# Patient Record
Sex: FEMALE | Race: WHITE | NOT HISPANIC OR LATINO | Employment: UNEMPLOYED | URBAN - METROPOLITAN AREA
[De-identification: names, ages, dates, MRNs, and addresses within clinical notes are randomized per-mention and may not be internally consistent; named-entity substitution may affect disease eponyms.]

---

## 2023-10-30 LAB
C TRACH DNA SPEC QL PROBE+SIG AMP: NOT DETECTED
N GONORRHOEA DNA SPEC QL PROBE+SIG AMP: NOT DETECTED
SPECIMEN DESCRIP: NORMAL
SPECIMEN DESCRIPTION: NORMAL
TSH SERPL-ACNC: 2.37 MIU/L (ref 0.4–4.5)

## 2023-11-20 ENCOUNTER — TRANSFERRED RECORDS (OUTPATIENT)
Dept: HEALTH INFORMATION MANAGEMENT | Facility: CLINIC | Age: 28
End: 2023-11-20

## 2023-12-04 ENCOUNTER — VIRTUAL VISIT (OUTPATIENT)
Dept: OBGYN | Facility: CLINIC | Age: 28
End: 2023-12-04
Payer: COMMERCIAL

## 2023-12-04 DIAGNOSIS — Z34.90 ENCOUNTER FOR PRENATAL CARE: Primary | ICD-10-CM

## 2023-12-04 RX ORDER — MECLIZINE HYDROCHLORIDE 25 MG/1
25 TABLET ORAL AT BEDTIME
COMMUNITY
End: 2024-06-05

## 2023-12-04 RX ORDER — LANOLIN ALCOHOL/MO/W.PET/CERES
400 CREAM (GRAM) TOPICAL DAILY
COMMUNITY
End: 2024-06-05

## 2023-12-04 RX ORDER — CALCIUM CARBONATE 500 MG/1
1 TABLET, CHEWABLE ORAL PRN
COMMUNITY
End: 2024-08-08

## 2023-12-04 NOTE — PROGRESS NOTES
PRAKASH RN Prenatal Intake note  Subjective     28 year old female newly pregnant.  LMP: 10-8-23.    regular cycles  Pregnancy is unplanned but welcomed.    Partner/support person name and relationship - Tyrone.        Symptoms since LMP include nausea, breast tenderness, and fatigue. Patient has tried these relief   measures: increased rest and increased fluids.    OB HISTORY        OB COMPLICATIONS  First Pregnancy Yes: first pregnancy  GDM No  HTNNo  Preeclampsia No   labor No   birth No   birth No  PP hemorrhage No  Retained placenta No  PP mood disorder No  Fetal anomaly No  FGR No  Macrosomia  No  3rd or 4th degree laceration  No  Shoulder dystocia No  NICU admit No       PERSONAL/SOCIAL HISTORY  *updated all tabs in history    lives with their spouse.  Employment: Unemployed as a stays at home.  Job involves sedentary activity .  Her partner works as a pharmacy student.   MENTAL HEALTH HISTORY:  none      - Current Medications see list   No current outpatient medications on file.           - Co-morbids none No past medical history on file.    - Genetic/Infection questionnaire completed, risks include none. Discussed genetic screening options, patient does desire first trimester genetic screening  Pt  does not have a recent known exposure to Parvo or CMV so IgG/IgM testing WILL NOT be ordered.   Flu Vaccine.  Patient already received Flu Vaccine  COVID Vaccine: Has received most recent COVID booster  - Discussed expectations for routine prenatal care and scheduling.  -Discussed highlights from [employer] Expectant [employer] booklet on warning signs, safe pregnancy and prevention of infections diseases, nutrition and exercise.  - Patient was encouraged to start prenatal vitamins as tolerated, if experiencing nausea and vomiting then OK to switch to folic acid only at this time.    -Additional items: None  -Reconciled and reviewed problem list  -Pt scheduled for dating US and NOB on 23  US @ 1:20 and visit to follow    Joyce Rene RN

## 2023-12-04 NOTE — PATIENT INSTRUCTIONS
Thank you for trusting us with your care!     If you need to contact us for questions about:  Symptoms, Scheduling & Medical Questions; Non-urgent (2-3 day response) Miguelangel message, Urgent (needing response today) [phone] (if after 3:30pm next day response)   Prescriptions: Please call your Pharmacy   Billing: Kari [phone] or  Physicians:[phone]     [facility] Cold:  Rest and drink plenty of fluids.  A vaporizer may help.    For aches and fever  Acetaminophen (Tylenol)    For Sore Throat  Gargle with warm salt water. Suck on hard candy, ice or popsicles. Eat soft, soothing foods. Drink cool or warm liquids.  You may also take:    Cough drops, such as Halls, Ricola, Cepacol or Chloraseptic.  Acetaminophen    For Cough  Avoid products that contain alcohol.  You may take:    Cough drops, such as Halls, Ricola, Cepacol or Chloraseptic.  Guaifenesin (Mucinex, plain Robitussin) for dry cough.   Dextromethorphan (plain Robitussin, Delsym) to suppress a cough.    For nasal congestion (stuffy head)  You may take:  Saline nasal spray  Afrin nasal spray. Do not use this for more than 3 or 4 days.  Pseudoephedrine (plain Sudafed). Use with caution and only after consulting your care provider.  Do not use this if you have high blood pressure.    Allergies (such as runny nose or sneezing)  Diphenhydramine (plain Benadryl)  Chlorpheniramine (Chlor-Trimeton)  Second generation antihistamines such as Claritin (loratadine), or Zyrtec (cetirizine).    Flu (influenza) prevention  Pregnant women should be vaccinated early in the flu season (October through May) as soon as the vaccine is available regardless how far along you are in your pregnancy, (Do not use the FluMist nasal inhalation).    Headaches, pain and inflammation  Do not take ibuprofen (Advil or Motrin), naproxen sodium (Aleve), aspirin or salicylates without first speaking with your provider.  These may not be safe during all stages or  pregnancy.  Instead, you may use:  Acetaminophen (Tylenol).  If Tylenol does not help your headache, call your care provider.  This could be a sign of high blood pressure.    Learning About Pregnancy  Your Care Instructions     Your health in the early weeks of your pregnancy is particularly important for your baby's health. Take good care of yourself. Anything you do that harms your body can also harm your baby.  Make sure to go to all of your doctor appointments. Regular checkups will help keep you and your baby healthy.  How can you care for yourself at home?  Diet    Eat a balanced diet. Make sure your diet includes plenty of beans, peas, and leafy green vegetables.     Do not skip meals or go for many hours without eating. If you are nauseated, try to eat a small, healthy snack every 2 to 3 hours.     Do not eat fish that has a high level of mercury, such as shark, swordfish, or mackerel. Do not eat more than one can of tuna each week.     Drink plenty of fluids. If you have kidney, heart, or liver disease and have to limit fluids, talk with your doctor before you increase the amount of fluids you drink.     Cut down on caffeine, such as coffee, tea, and cola.     Do not drink alcohol, such as beer, wine, or hard liquor.     Take a multivitamin that contains at least 400 micrograms (mcg) of folic acid to help prevent birth defects. Fortified cereal and whole wheat bread are good additional sources of folic acid.     Increase the calcium in your diet. Try to drink a quart of skim milk each day. You may also take calcium supplements and choose foods such as cheese and yogurt.   Lifestyle    Make sure you go to your follow-up appointments.     Get plenty of rest. You may be unusually tired while you are pregnant.     Get at least 30 minutes of exercise on most days of the week. Walking is a good choice. If you have not exercised in the past, start out slowly. Take several short walks each day.     Do not smoke. If  you need help quitting, talk to your doctor about stop-smoking programs. These can increase your chances of quitting for good.     Do not touch cat feces or litter boxes. Also, wash your hands after you handle raw meat, and fully cook all meat before you eat it. Wear gloves when you work in the yard or garden, and wash your hands well when you are done. Cat feces, raw or undercooked meat, and contaminated dirt can cause an infection that may harm your baby or lead to a miscarriage.     Avoid things that can make your body too hot and may be harmful to your baby, such as a hot tub or sauna. Or talk with your doctor before doing anything that raises your body temperature. Your doctor can tell you if it's safe.     Avoid chemical fumes, paint fumes, or poisons.     Do not use illegal drugs, marijuana, or alcohol.   Medicines    Review all of your medicines with your doctor. Some of your routine medicines may need to be changed to protect your baby.     Use acetaminophen (Tylenol) to relieve minor problems, such as a mild headache or backache or a mild fever with cold symptoms. Do not use nonsteroidal anti-inflammatory drugs (NSAIDs), such as ibuprofen (Advil, Motrin) or naproxen (Aleve), unless your doctor says it is okay.     Do not take two or more pain medicines at the same time unless the doctor told you to. Many pain medicines have acetaminophen, which is Tylenol. Too much acetaminophen (Tylenol) can be harmful.     Take your medicines exactly as prescribed. Call your doctor if you think you are having a problem with your medicine.   To manage morning sickness    If you feel sick when you first wake up, try eating a small snack (such as crackers) before you get out of bed. Allow some time to digest the snack, and then get out of bed slowly.     Do not skip meals or go for long periods without eating. An empty stomach can make nausea worse.     Eat small, frequent meals instead of three large meals each day.      "Drink plenty of fluids.     Eat foods that are high in protein but low in fat.     If you are taking iron supplements, ask your doctor if they are necessary. Iron can make nausea worse.     Avoid any smells, such as coffee, that make you feel sick.     Get lots of rest. Morning sickness may be worse when you are tired.   Follow-up care is a key part of your treatment and safety. Be sure to make and go to all appointments, and call your doctor if you are having problems. It's also a good idea to know your test results and keep a list of the medicines you take.  Where can you learn more?  Go to https://www.[employer].net/patiented  Enter E868 in the search box to learn more about \"Learning About Pregnancy.\"  Current as of: July 11, 2023               Content Version: 13.8    9876-1637 [employer].   Care instructions adapted under license by your healthcare professional. If you have questions about a medical condition or this instruction, always ask your healthcare professional. [employer] disclaims any warranty or liability for your use of this information.      Weeks 6 to 10 of Your Pregnancy: Care Instructions  During these weeks of pregnancy, your body goes through many changes. You may start to feel different, both in your body and your emotions. Each pregnancy is different, so there's no \"right\" way to feel. These early weeks are a time to make healthy choices for you and your pregnancy.    Take a daily prenatal vitamin. Choose one with folic acid in it.   Avoid alcohol, tobacco, and drugs (including marijuana). If you need help quitting, talk to your doctor.     Drink plenty of liquids.  Be sure to drink enough water. And limit sodas, other sweetened drinks, and caffeine.     Choose foods that are good sources of calcium, iron, and folate.  You can try dairy products, dark leafy greens, fortified orange juice and cereals, almonds, broccoli, dried fruit, and beans.     Avoid foods that " "may be harmful.  Don't eat raw meat, deli meat, raw seafood, or raw eggs. Avoid soft cheese and unpasteurized dairy, like Brie and blue cheese. And don't eat fish that contains a lot of mercury, like shark and swordfish.     Don't touch reg litter or cat poop.  They can cause an infection that could be harmful during pregnancy.     Avoid things that can make your body too hot.  For example, avoid hot tubs and saunas.     Soothe morning sickness.  Try eating 5 or 6 small meals a day, getting some fresh air, or using vianey to control symptoms.     Ask your doctor about flu and COVID-19 shots.  Getting them can help protect against infection.   Follow-up care is a key part of your treatment and safety. Be sure to make and go to all appointments, and call your doctor if you are having problems. It's also a good idea to know your test results and keep a list of the medicines you take.  Where can you learn more?  Go to https://[email or web address]/patiented  Enter G112 in the search box to learn more about \"Weeks 6 to 10 of Your Pregnancy: Care Instructions.\"  Current as of: July 11, 2023               Content Version: 13.8    8721-9627 [employer].   Care instructions adapted under license by your healthcare professional. If you have questions about a medical condition or this instruction, always ask your healthcare professional. [employer] disclaims any warranty or liability for your use of this information.         Managing Morning Sickness (01:55)  Your health professional recommends that you watch this short online health video.  Learn tips for dealing with morning sickness, no matter what time of day you have it.  Purpose:  Gives tips for managing morning sickness, including eating small low-fat meals and avoiding caffeine and spicy food.  Goal:  The user will learn tips for dealing with morning sickness during pregnancy.     How to watch the video    Scan the QR code   OR Visit the " website    [email or web address].Always [email or web address]/Ftwgsiz1hywub   Current as of: July 11, 2023               Content Version: 13.8    4180-2077 [employer].   Care instructions adapted under license by your healthcare professional. If you have questions about a medical condition or this instruction, always ask your healthcare professional. [employer] disclaims any warranty or liability for your use of this information.      Pregnancy and Heartburn: Care Instructions  Overview     Heartburn is a common problem during pregnancy.  Heartburn happens when stomach acid backs up into the tube that carries food to the stomach. This tube is called the esophagus. Early in pregnancy, heartburn is caused by hormone changes that slow down digestion. Later on, it's also caused by the large uterus pushing up on the stomach.  Even though you can't fix the cause, there are things you can do to get relief. Treating heartburn during pregnancy focuses first on making lifestyle changes, like changing what and how you eat, and on taking medicines.  Heartburn usually improves or goes away after childbirth.  Follow-up care is a key part of your treatment and safety. Be sure to make and go to all appointments, and call your doctor if you are having problems. It's also a good idea to know your test results and keep a list of the medicines you take.  How can you care for yourself at home?  Eat small, frequent meals.  Avoid foods that make your symptoms worse, such as chocolate, peppermint, and spicy foods. Avoid drinks with caffeine, such as coffee, tea, and sodas.  Avoid bending over or lying down after meals.  Take a short walk after you eat.  If heartburn is a problem at night, do not eat for 2 hours before bedtime.  Take antacids like Mylanta, Maalox, Rolaids, or Tums. Do not take antacids that have sodium bicarbonate, magnesium trisilicate, or aspirin. Be careful when you take over-the-counter antacid medicines. Many of  "these medicines have aspirin in them. While you are pregnant, do not take aspirin or medicines that contain aspirin unless your doctor says it is okay.  If you're not getting relief, talk to your doctor. You may be able to take a stronger acid-reducing medicine.  When should you call for help?   Call your doctor now or seek immediate medical care if:    You have new or worse belly pain.     You are vomiting.   Watch closely for changes in your health, and be sure to contact your doctor if:    You have new or worse symptoms of reflux.     You are losing weight.     You have trouble or pain swallowing.     You do not get better as expected.   Where can you learn more?  Go to https://www.Njini.net/patiented  Enter U946 in the search box to learn more about \"Pregnancy and Heartburn: Care Instructions.\"  Current as of: July 11, 2023               Content Version: 13.8    6748-7745 [employer].   Care instructions adapted under license by your healthcare professional. If you have questions about a medical condition or this instruction, always ask your healthcare professional. [employer] disclaims any warranty or liability for your use of this information.      Constipation: Care Instructions  Overview     Constipation means that you have a hard time passing stools (bowel movements). People pass stools from 3 times a day to once every 3 days. What is normal for you may be different. Constipation may occur with pain in the rectum and cramping. The pain may get worse when you try to pass stools. Sometimes there are small amounts of bright red blood on toilet paper or the surface of stools. This is because of enlarged veins near the rectum (hemorrhoids).  A few changes in your diet and lifestyle may help you avoid ongoing constipation. Your doctor may also prescribe medicine to help loosen your stool.  Some medicines can cause constipation. These include pain medicines and antidepressants. Tell " your doctor about all the medicines you take. Your doctor may want to make a medicine change to ease your symptoms.  Follow-up care is a key part of your treatment and safety. Be sure to make and go to all appointments, and call your doctor if you are having problems. It's also a good idea to know your test results and keep a list of the medicines you take.  How can you care for yourself at home?  Drink plenty of fluids. If you have kidney, heart, or liver disease and have to limit fluids, talk with your doctor before you increase the amount of fluids you drink.  Include high-fiber foods in your diet each day. These include fruits, vegetables, beans, and whole grains.  Get at least 30 minutes of exercise on most days of the week. Walking is a good choice. You also may want to do other activities, such as running, swimming, cycling, or playing tennis or team sports.  Take a fiber supplement, such as Citrucel or Metamucil, every day. Read and follow all instructions on the label.  Schedule time each day for a bowel movement. A daily routine may help. Take your time having a bowel movement, but don't sit for more than 10 minutes at a time. And don't strain too much.  Support your feet with a small step stool when you sit on the toilet. This helps flex your hips and places your pelvis in a squatting position.  Your doctor may recommend an over-the-counter laxative to relieve your constipation. Examples are Milk of Magnesia and MiraLax. Read and follow all instructions on the label. Do not use laxatives on a long-term basis.  When should you call for help?   Call your doctor now or seek immediate medical care if:    You have new or worse belly pain.     You have new or worse nausea or vomiting.     You have blood in your stools.   Watch closely for changes in your health, and be sure to contact your doctor if:    Your constipation is getting worse.     You do not get better as expected.   Where can you learn more?  Go to  "https://www.healthwise.net/patiented  Enter P343 in the search box to learn more about \"Constipation: Care Instructions.\"  Current as of: March 21, 2023               Content Version: 13.8 2006-2023 [employer].   Care instructions adapted under license by your healthcare professional. If you have questions about a medical condition or this instruction, always ask your healthcare professional. [employer] disclaims any warranty or liability for your use of this information.      Learning About High-Iron Foods  What foods are high in iron?     The foods you eat contain nutrients, such as vitamins and minerals. Iron is a nutrient. Your body needs the right amount to stay healthy and work as it should. You can use the list below to help you make choices about which foods to eat.  Here are some foods that contain iron. They have 1 to 2 milligrams of iron per serving.  Fruits  Figs (dried), 5 figs  Vegetables  Asparagus (canned), 6 finley  Lizbet, beet, Swiss chard, or turnip greens, 1 cup  Dried peas, cooked,   cup  Seaweed, spirulina (dried),   cup  Spinach, (cooked)   cup or (raw) 1 cup  Grains  Cereals, fortified with iron, 1 cup  Grits (instant, cooked), fortified with iron,   cup  Meats and other protein foods  Beans (kidney, lima, navy, white), canned or cooked,   cup  Beef or lamb, 3 oz  Chicken giblets, 3 oz  Chickpeas (garbanzo beans),   cup  Liver of beef, lamb, or pork, 3 oz  Oysters (cooked), 3 oz  Sardines (canned), 3 oz  Soybeans (boiled),   cup  Tofu (firm),   cup  Work with your doctor to find out how much of this nutrient you need. Depending on your health, you may need more or less of it in your diet.  Where can you learn more?  Go to https://www.healthartandseek.net/patiented  Enter R005 in the search box to learn more about \"Learning About High-Iron Foods.\"  Current as of: February 28, 2023               Content Version: 13.8 2006-2023 [employer].   Care " instructions adapted under license by your healthcare professional. If you have questions about a medical condition or this instruction, always ask your healthcare professional. Healthwise, [facility] disclaims any warranty or liability for your use of this information.      Rh Antibodies Screening During Pregnancy: About This Test  What is it?     The Rh antibodies screening test is a blood test. It checks your blood for Rh antibodies. If you have Rh-negative blood and have been exposed to Rh-positive blood, your immune system may make antibodies to attack the Rh-positive blood. When a pregnant woman has these antibodies, it is called Rh sensitization.  Why is this test done?  The Rh antibodies screening test is done during pregnancy to find out if your baby is at risk for Rh disease. This can happen if you have Rh-negative blood and your baby has Rh-positive blood. If your Rh-negative blood mixes with Rh-positive blood, your immune system will make antibodies to attack the Rh-positive blood.  During pregnancy, these antibodies could attach to the baby's red blood cells. This can cause your baby to have serious health problems. The results of this test will help your doctor know how to best care for you and your baby during your pregnancy.  How do you prepare for the test?  In general, there's nothing you have to do before this test, unless your doctor tells you to.  How is the test done?  A health professional uses a needle to take a blood sample, usually from the arm.  What happens after the test?  You will probably be able to go home right away. It depends on the reason for the test.  You can go back to your usual activities right away.  Follow-up care is a key part of your treatment and safety. Be sure to make and go to all appointments, and call your doctor if you are having problems. It's also a good idea to keep a list of the medicines you take. Ask your doctor when you can expect to have your test  "results.  Where can you learn more?  Go to https://www.[employer].net/patiented  Enter P722 in the search box to learn more about \"Rh Antibodies Screening During Pregnancy: About This Test.\"  Current as of: 2023               Content Version: 13.8    3874-3445 [employer].   Care instructions adapted under license by your healthcare professional. If you have questions about a medical condition or this instruction, always ask your healthcare professional. [employer] disclaims any warranty or liability for your use of this information.      Learning About Preventing Rh Disease  What is Rh disease?     Rh disease can be a serious problem in pregnancy. It happens when substances called antibodies in the mother's blood cause red blood cells in her baby's blood to be destroyed. This can occur when the blood types of a mother and her baby do not match.  All blood has an Rh factor. This is what makes a blood type positive or negative. When you are Rh-negative, your baby may be Rh-negative or Rh-positive. If your baby has Rh-positive blood and it mixes with yours, your body will make antibodies. This is called Rh sensitization.  Most of the time, this is not a problem in a first pregnancy. But in future pregnancies, it could cause Rh disease.  A  with Rh disease has mild anemia and may have jaundice. In severe cases, anemia, jaundice, and swelling can be very dangerous or fatal. Some babies need to be delivered early. Some need special care in the NICU. A very sick baby will need a blood transfusion before or after birth.  Fortunately, Rh sensitization is usually easy to prevent.  That's why it's important to get your Rh status checked in your first trimester. It doesn't cause any warning signs. A blood test is the only way to know if you are Rh-sensitive or are at risk for it.  How can you prevent Rh disease?  If you are Rh-negative, your doctor gives you an Rh immune globulin shot " "(such as RhoGAM). It helps prevent your body from making the antibodies that attack your baby's red blood cells.  Timing is important. You need the shot at certain times during your pregnancy. And you need one anytime there is a chance that your baby's blood might mix with yours. That can happen with certain prenatal tests or when you have pregnancy bleeding, such as:  Right after any pregnancy loss, amniocentesis, or CVS testing.  After turning of a breech baby.  Before and maybe after childbirth. Your doctor gives you a shot around week 28. If your  is Rh-positive, you will have another shot.  Follow-up care is a key part of your treatment and safety. Be sure to make and go to all appointments, and call your doctor if you are having problems. It's also a good idea to know your test results and keep a list of the medicines you take.  Where can you learn more?  Go to https://www.[employer].PartyWithMe/patiented  Enter W177 in the search box to learn more about \"Learning About Preventing Rh Disease.\"  Current as of: 2023               Content Version: 13.8    5717-8307 [employer].   Care instructions adapted under license by your healthcare professional. If you have questions about a medical condition or this instruction, always ask your healthcare professional. [employer] disclaims any warranty or liability for your use of this information.      Learning About Rh Immunoglobulin Shots  Introduction     An Rh immunoglobulin shot is given to pregnant women who have Rh-negative blood.  You may have Rh-negative blood, and your baby may have Rh-positive blood. If the two types of blood mix, your body will make antibodies. This is called Rh sensitization. Most of the time, this is not a problem the first time you're pregnant. But it could cause problems in future pregnancies.  This shot keeps your body from making the antibodies. You get the shot around 28 weeks of pregnancy. After the " "birth, your baby's blood is tested. If the blood is Rh positive, you will get another shot. You may also get the shot if you have vaginal bleeding while you are pregnant or if you have a miscarriage. These shots protect future pregnancies.  Women with Rh negative blood will need this shot each time they get pregnant.  Example  Rh immunoglobulin (HypRho-D, MICRhoGAM, and RhoGAM)  Possible side effects  Rare side effects may include:  Some mild pain where you got the shot.  A slight fever.  An allergic reaction.  You may have other side effects not listed here. Check the information that comes with your medicine.  What to know about taking this medicine  You may need more than one shot. You may need the shot again:  After amniocentesis, fetal blood sampling, or chorionic villus sampling tests.  If you have bleeding in your second or third trimester.  After turning of a breech baby.  After an injury to the belly while you are pregnant.  After a miscarriage or an .  Before or right after treatment for an ectopic or a partial molar pregnancy.  Tell your doctor if you have any allergies or have had a bad response to medicines in the past.  If you get this shot within 3 months of getting a live-virus vaccine, the vaccine may not work. Your doctor will tell you if you need more vaccine.  Check with your doctor or pharmacist before you use any other medicines. This includes over-the-counter medicines. Make sure your doctor knows all of the medicines, vitamins, herbs, and supplements you take. Taking some medicines at the same time can cause problems.  Where can you learn more?  Go to https://www.[email or web address].net/patiented  Enter V615 in the search box to learn more about \"Learning About Rh Immunoglobulin Shots.\"  Current as of: 2023               Content Version: 13.8    5161-3966 [employer].   Care instructions adapted under license by your healthcare professional. If you have questions about a " medical condition or this instruction, always ask your healthcare professional. Healthwise, [facility] disclaims any warranty or liability for your use of this information.      Rubella (Swazi Measles): Care Instructions  Overview  Rubella, also called Swazi measles or 3-day measles, is a disease caused by a virus. It spreads by coughs, sneezes, and close contact. Rubella usually is mild and does not cause long-term problems. But if you are pregnant and get it, you can give the disease to your unborn baby. This can cause serious birth defects.  While you have rubella, you may get a rash and a mild fever, and the lymph glands in your neck may swell. Older children often have a fever, eye pain, a sore throat, and body aches. You can relieve most symptoms with care at home. Avoid being around others, especially pregnant people, until your rash has been gone for at least 4 days. People who have not had this disease before or have not had the vaccine have the greatest chance of getting the virus.  Follow-up care is a key part of your treatment and safety. Be sure to make and go to all appointments, and call your doctor if you are having problems. It's also a good idea to know your test results and keep a list of the medicines you take.  How can you care for yourself at home?  Drink plenty of fluids. If you have kidney, heart, or liver disease and have to limit fluids, talk with your doctor before you increase the amount of fluids you drink.  Get plenty of rest to help your body heal.  Take an over-the-counter pain medicine, such as acetaminophen (Tylenol), ibuprofen (Advil, Motrin), or naproxen (Aleve), to reduce fever and discomfort. Read and follow all instructions on the label. Do not give aspirin to anyone younger than 20. It has been linked to Reye syndrome, a serious illness.  Do not take two or more pain medicines at the same time unless the doctor told you to. Many pain medicines have acetaminophen, which is  "Tylenol. Too much acetaminophen (Tylenol) can be harmful.  Try not to scratch the rash. Put cold, wet cloths on the rash to reduce itching.  Do not smoke. Smoking can make your symptoms worse. If you need help quitting, talk to your doctor about stop-smoking programs and medicines. These can increase your chances of quitting for good.  Avoid contact with people who have never had rubella and who have not been immunized.  When should you call for help?   Call your doctor now or seek immediate medical care if:    You have a fever with a stiff neck or a severe headache.     You are sensitive to light or feel very sleepy or confused.   Watch closely for changes in your health, and be sure to contact your doctor if:    You do not get better as expected.   Where can you learn more?  Go to https://[email or web address].net/patiented  Enter B812 in the search box to learn more about \"Rubella ([place] Measles): Care Instructions.\"  Current as of: 2023               Content Version: 13.8    8778-6764 [employer].   Care instructions adapted under license by your healthcare professional. If you have questions about a medical condition or this instruction, always ask your healthcare professional. [employer] disclaims any warranty or liability for your use of this information.      Gonorrhea and Chlamydia: About These Tests  What is it?  These tests use a sample of urine or other body fluid to look for the bacteria that cause these sexually transmitted infections (STIs). The fluid sample can come from the cervix, vagina, rectum, throat, or eyes.  Why is this test done?  These tests may be done to:  Find out if symptoms are caused by gonorrhea or chlamydia.  Check people who are at high risk of being infected with gonorrhea or chlamydia.  Retest people several months after they have been treated for gonorrhea or chlamydia.  Check for infection in your  if you had a gonorrhea or chlamydia " "infection at the time of delivery.  How can you prepare for the test?  If you are going to have a urine test, do not urinate for at least 1 hour before the test.  If you think you may have chlamydia or gonorrhea, don't have sexual intercourse until you get your test results. And you may want to have tests for other STIs, such as HIV.  How is the test done?  For a direct sample, a swab is used to collect body fluid from the cervix, vagina, rectum, throat, or eyes. Your doctor may collect the sample. Or you may be given instructions on how to collect your own sample.  For a urine sample, you will collect the urine that comes out when you first start to urinate. Don't wipe the genital area clean before you urinate.  How long does the test take?  The test will take a few minutes.  What happens after the test?  You will be able to go home right away.  You can go back to your usual activities right away.  If you do have an infection, don't have sexual intercourse for 7 days after you start treatment. And your sex partner(s) should also be treated.  Follow-up care is a key part of your treatment and safety. Be sure to make and go to all appointments, and call your doctor if you are having problems. It's also a good idea to keep a list of the medicines you take. Ask your doctor when you can expect to have your test results.  Where can you learn more?  Go to https://www.[employer]sigo.net/patiented  Enter K976 in the search box to learn more about \"Gonorrhea and Chlamydia: About These Tests.\"  Current as of: April 19, 2023               Content Version: 13.8    1788-5854 [employer].   Care instructions adapted under license by your healthcare professional. If you have questions about a medical condition or this instruction, always ask your healthcare professional. [employer] disclaims any warranty or liability for your use of this information.      Trichomoniasis: About This Test  What is it?     This " test uses a sample of urine or other body fluid to look for the tiny parasite that causes trichomoniasis (also called trich). The fluid sample can come from the vagina, cervix, or urethra. Your doctor may choose to use one or more of many available tests.  Why is it done?  A trich test may be done to:  Find out if symptoms are caused by trich.  Check people who are at high risk for being infected with trich.  Check after treatment to make sure that the infection is gone.  How do you prepare for the test?  If you are going to have a urine test, do not urinate for at least 1 hour before the test.  How is the test done?  For a direct sample, a swab is used to collect body fluid from the cervix, vagina, or urethra. Your doctor may collect the sample. Or you may be given instructions on how to collect your own sample.  For a urine sample, you will collect the urine that comes out when you first start to urinate. Don't wipe the area clean before you urinate.  How long does the test take?  It will take a few minutes to collect a sample.  What happens after the test?  You can go home right away.  You can go back to your usual activities right away.  You may get the test results the same day or several days later. It depends on the test used.  If you do have an infection, don't have sexual intercourse for 7 days after you start treatment. Your sex partner(s) should also be treated.  Follow-up care is a key part of your treatment and safety. Be sure to make and go to all appointments, and call your doctor if you are having problems. Ask your doctor when you can expect to have your test results.  Current as of: April 19, 2023               Content Version: 13.8    7129-4958 [employer].   Care instructions adapted under license by your healthcare professional. If you have questions about a medical condition or this instruction, always ask your healthcare professional. [employer] disclaims any  "warranty or liability for your use of this information.      HIV Testing: Care Instructions  Overview  You can get tested for the human immunodeficiency virus (HIV). Most doctors use a blood test to check for HIV antibodies and antigens in your blood. It may also check for the genetic material (RNA) of HIV. Some tests use saliva to check for HIV antibodies. But these aren't as accurate. For example, they may give a false result if you've just been infected.  What do the results mean?    Normal (negative)    No HIV antibodies, antigens, or RNA were found.  You may need more testing. It can make sure your test results are correct.    Uncertain (indeterminate)    Test results didn't clearly show if you have an HIV infection.  HIV antibodies or antigens may not have formed yet.  Some other type of antibody or antigen may have affected the results.  You will need another test to be sure.    Abnormal (positive)    HIV antibodies, antigens, or RNA were found.  If you haven't had an RNA test yet, one will be done. If it's positive, you have HIV.  If your test result is positive, your doctor will talk to you. You will discuss starting treatment.  Follow-up care is a key part of your treatment and safety. Be sure to make and go to all appointments, and call your doctor if you are having problems. It's also a good idea to know your test results and keep a list of the medicines you take.  Where can you learn more?  Go to https://www.WILEX.net/patiented  Enter T792 in the search box to learn more about \"HIV Testing: Care Instructions.\"  Current as of: June 13, 2023               Content Version: 13.8    2098-4026 [employer].   Care instructions adapted under license by your healthcare professional. If you have questions about a medical condition or this instruction, always ask your healthcare professional. [employer] disclaims any warranty or liability for your use of this " "information.      Hepatitis C Virus Tests: About These Tests  What are they?     Hepatitis C virus tests are blood tests that check for substances in the blood that show whether you have hepatitis C now or had it in the past. The tests can also tell you what type of hepatitis C you have and how severe the disease is. This can help your doctor with treatment.  If the tests show that you have long-term hepatitis C, you need to take steps to prevent spreading the disease.  Why are these tests done?  You may need these tests if:  You have symptoms of hepatitis.  You may have been exposed to the virus. You are more likely to have been exposed to the virus if you inject drugs or are exposed to body fluids (such as if you are a health care worker).  You've had other tests that show you have liver problems.  You are 18 to 79 years old.  You have an HIV infection.  The tests also are done to help your doctor decide about your treatment and see how well it works.  How do you prepare for the test?  In general, there's nothing you have to do before this test, unless your doctor tells you to.  How is the test done?  A health professional uses a needle to take a blood sample, usually from the arm.  What happens after these tests?  You will probably be able to go home right away.  You can go back to your usual activities right away.  Follow-up care is a key part of your treatment and safety. Be sure to make and go to all appointments, and call your doctor if you are having problems. It's also a good idea to keep a list of the medicines you take. Ask your doctor when you can expect to have your test results.  Where can you learn more?  Go to https://www.healthClaritics.net/patiented  Enter W551 in the search box to learn more about \"Hepatitis C Virus Tests: About These Tests.\"  Current as of: June 13, 2023               Content Version: 13.8    1170-2131 [employer].   Care instructions adapted under license by your " healthcare professional. If you have questions about a medical condition or this instruction, always ask your healthcare professional. Healthwise, [facility] disclaims any warranty or liability for your use of this information.      Learning About Fetal Ultrasound Results  What is a fetal ultrasound?     Fetal ultrasound is a test that lets your doctor see an image of your baby. Your doctor learns information about your baby from this picture. You may find out, for example, if you are having a boy or a girl. But the main reason you have this test is to get information about your baby's health.  (You may hear your baby called a fetus. This is a common medical term for a baby that's growing in the mother's uterus.)  What kind of information can you learn from this test?  The findings of an ultrasound fall into two categories, normal and abnormal.  Normal  The fetus is the right size for its age.  The placenta is the expected size and does not cover the cervix.  There is enough amniotic fluid in the uterus.  No birth defects can be seen.  Abnormal  The fetus is small or large for its age.  The placenta covers the cervix.  There is too much or too little amniotic fluid in the uterus.  The fetus may have a birth defect.  What does an abnormal result mean?  Abnormal seems to imply that something is wrong with your baby. But what it means is that the test has shown something the doctor wants to take a closer look at.  And that's what happens next. Your doctor will talk to you about what further test or tests you may need.  What do the results mean?  Some of the things your doctor may see on an abnormal ultrasound include:  Echogenic bowel.  The bowel looks very bright on the screen. This could mean that there's blood in the bowel. Or it could mean that something is blocking the small bowel.  Increased nuchal translucency.  The ultrasound measures the thickness at the back of the baby's neck. An increase in thickness is  "sometimes an early sign of Down syndrome.  Increased or decreased amniotic fluid.  The doctor will look for a reason for the level of amniotic fluid and will watch the pregnancy closely as it progresses.  Large ventricles.  Ventricles in the brain look larger than they should. Your doctor may take a closer look at the brain.  Renal pyelectasis/hydronephrosis.  The ultrasound measures the fluid around the kidney. If there is more fluid than expected, there is a chance of urinary tract or kidney problems.  Short long bones.  The ultrasound measures certain arm and leg bones. A long bone (humerus or femur) that is shorter than average could be a sign of Down syndrome.  Subchorionic hemorrhage.  An ultrasound can show bleeding under one of the membranes that surrounds the fetus. Some women don't have symptoms of bleeding. The ultrasound can find this problem when women are not bleeding from their vagina. Women who have this condition have a slightly higher chance of miscarriage.  What do you do now?  Take a deep breath, and let it out. Keep in mind that an abnormal finding on an ultrasound, after it's coupled with more information, may:  Turn out to be nothing.  Turn out to be something mild that won't affect the baby.  Turn out to be something more serious. But if this happens, early diagnosis helps you and your doctor plan treatment options sooner rather than later.  Your medical team is there for you. So are your family and friends. Ask questions, and get the help and support you need.  Follow-up care is a key part of your treatment and safety. Be sure to make and go to all appointments, and call your doctor if you are having problems. It's also a good idea to know your test results and keep a list of the medicines you take.  Where can you learn more?  Go to https://www.healthwise.net/patiented  Enter K451 in the search box to learn more about \"Learning About Fetal Ultrasound Results.\"  Current as of: July 11, " 2023               Content Version: 13.8    5574-2940 [employer].   Care instructions adapted under license by your healthcare professional. If you have questions about a medical condition or this instruction, always ask your healthcare professional. [employer] disclaims any warranty or liability for your use of this information.      Learning About Prenatal Visits  Overview     Regular prenatal visits are very important during any pregnancy. These quick office visits may seem simple and routine. But they can help you have a safe and healthy pregnancy. Your doctor is watching for problems that can only be found through regular checkups. The visits also give you and your doctor time to build a good relationship.  It's common to see your doctor every 4 weeks until week 28 of pregnancy. Then the visits will happen more often. From weeks 28 to 36, it's common to have visits every 2 to 3 weeks. In the final month of pregnancy, you likely will see your doctor every week. Your doctor may want to see you more or less often, depending on your health, your age, and if you've had a normal, full-term pregnancy before.  At different times in your pregnancy, you will have exams and tests. Some are routine. Others are done only when there is a chance of a problem. Everything healthy you do for your body helps you have a healthy pregnancy. Rest when you need it. Eat well, drink plenty of water, and exercise regularly.  What happens during a prenatal visit?  You will have blood pressure checks, along with urine tests. You also may have blood tests. If you need to go to the bathroom while waiting for the doctor, tell the nurse. You will be given a sample cup so your urine can be tested.  You will be weighed and have your belly measured.  Your doctor may listen to the fetal heartbeat with a special device.  At about 24 weeks, and possibly earlier in your pregnancy, your doctor will check your blood sugar  (glucose tolerance test) for diabetes that can occur during pregnancy. This is gestational diabetes, which can be harmful.  You will have tests to check for infections that could harm your . These include group B streptococcus and hepatitis B.  Your doctor may do ultrasounds to check for problems. This also checks the position of the fetus. An ultrasound uses sound waves to produce a picture of the fetus.  You may get your vaccines updated.  Your doctor may ask you questions to check for signs of anxiety or depression. Tell your doctor if you feel sad, anxious, or hopeless for more than a few days.  You may have other tests at any time during your pregnancy.  Use your visits to discuss with your doctor any concerns you have.  How can you care for yourself at home?  Get plenty of rest.  Try to exercise every day, if your doctor says it is okay. If you have not exercised in the past, start out slowly. For example, you can take short walks each day.  Choose healthy foods, such as fruits, vegetables, whole grains, lean proteins, low-fat dairy, and healthy fats.  Drink plenty of fluids. Cut down on drinks with caffeine, such as coffee, tea, and cola. If you have kidney, heart, or liver disease and have to limit fluids, talk with your doctor before you increase the amount of fluids you drink.  Try to avoid chemical fumes, paint fumes, and poisons.  If you smoke, vape, or use alcohol, marijuana, or other drugs, quit or cut back as much as you can. Talk to your doctor if you need help quitting.  Review all of your medicines, including over-the-counter medicines and supplements, with your doctor. Some of your routine medicines may need to be changed. Do not stop or start taking any medicines without talking to your doctor first.  Follow-up care is a key part of your treatment and safety. Be sure to make and go to all appointments, and call your doctor if you are having problems. It's also a good idea to know your test  "results and keep a list of the medicines you take.  Where can you learn more?  Go to https://www.healthFitnessManager.net/patiented  Enter J502 in the search box to learn more about \"Learning About Prenatal Visits.\"  Current as of: July 11, 2023               Content Version: 13.8    8745-1160 [employer].   Care instructions adapted under license by your healthcare professional. If you have questions about a medical condition or this instruction, always ask your healthcare professional. [employer] disclaims any warranty or liability for your use of this information.      Intimate Partner Violence: Care Instructions  Overview     If you want to save this information but don't think it is safe to take it home, see if a trusted friend can keep it for you. Plan ahead. Know who you can call for help, and memorize the phone number.   Be careful online too. Your online activity may be seen by others. Do not use your personal computer or device to read about this topic. Use a safe computer, such as one at work, a friend's home, or a library.    Intimate partner violence--a type of domestic abuse--is different from an argument now and then. It is a pattern of abuse that one person may use to control another person's behavior. It may start with threats and name-calling. Then, it may lead to more serious acts, like pushing and slapping. The abuse also may occur in other areas. For example, the abuser may withhold money or spend a partner's money without their knowledge.  Abuse can cause serious harm. You are more likely to have a long-term health problem from the injuries and stress of living in a violent relationship. People who are sexually abused by their partners have more sexually transmitted infections and unplanned pregnancies. Anyone who is abused also faces emotional pain. Anyone can be abused in relationships. In some relationships, both people use abusive behavior.  If you are pregnant, abuse can " "cause problems such as poor weight gain, infections, and bleeding. Abuse during this time may increase your baby's risk of low birth weight, premature birth, and death.  Follow-up care is a key part of your treatment and safety. Be sure to make and go to all appointments, and call your doctor if you are having problems. It's also a good idea to know your test results and keep a list of the medicines you take.  How can you care for yourself at home?  If you do not have a safe place to stay, discuss this with your doctor before you leave.  Have a plan for where to go, how to leave your home, and where to stay in case of an emergency. Do not tell your partner about your plan. Contact:  The National Domestic Violence Hotline toll-free at 1-356.907.7216. They can help you find resources in your area.  Your local police department, hospital, or clinic for information about shelters and safe homes near you.  Talk to a trusted friend or neighbor, a counselor, or a arron leader. Do not feel that you have to hide what happened.  Teach your children how to call for help in an emergency.  Be alert to warning signs, such as threats, heavy alcohol use, or drug use. This can help you avoid danger.  If you can, make sure that there are no guns or other weapons in your home.  When should you call for help?   Call 911 anytime you think you may need emergency care. For example, call if:    You or someone else has just been abused.     You think you or someone else is in danger of being abused.   Watch closely for changes in your health, and be sure to contact your doctor if you have any problems.  Where can you learn more?  Go to https://www.[employer].net/patiented  Enter G282 in the search box to learn more about \"Intimate Partner Violence: Care Instructions.\"  Current as of: June 25, 2023               Content Version: 13.8    1783-0731 [employer].   Care instructions adapted under license by your healthcare " professional. If you have questions about a medical condition or this instruction, always ask your healthcare professional. Healthwise, [facility] disclaims any warranty or liability for your use of this information.      Intimate Partner Violence Safety Instructions: Care Instructions  Overview     If you want to save this information but don't think it is safe to take it home, see if a trusted friend can keep it for you. Plan ahead. Know who you can call for help, and memorize the phone number.   Be careful online too. Your online activity may be seen by others. Do not use your personal computer or device to read about this topic. Use a safe computer, such as one at work, a friend's home, or a library.    When you are abused by a spouse or partner, you can take actions to protect yourself and your children.  You can increase your safety whether you decide to stay with your spouse or partner or you decide to leave. You may want to make a safety plan and pack a bag ahead of time. This will help you leave quickly when you decide to. Remember, you cannot change your partner's actions, but you can find help for you and your children. No one deserves to be abused.  Follow-up care is a key part of your treatment and safety. Be sure to make and go to all appointments, and call your doctor if you are having problems. It's also a good idea to know your test results and keep a list of the medicines you take.  How can you care for yourself at home?  Make a plan for your safety   If you decide to stay with your abusive spouse or partner, you can do the following to increase your safety:  Decide what works best to keep you safe in an emergency.  Know who you can call to help you in an emergency.  Decide if you will call the police if you get hurt again. If you can, agree on a signal with your children or neighbor to call the police for you if you need help. You can flash lights or hang something out of a window.  Choose a  safe place to go for a short time if you need to leave home. Memorize the address and phone number.  Learn escape routes out of your home in case you need to leave in a hurry. Teach your children different ways to get out of your home quickly if they need to.  If you can, hide or lock up things that can be used as weapons (guns, knives, hammers).  Learn the number of a domestic violence shelter. Talk to the people there about how they can help.  Find out about other community resources that can help you.  Take pictures of bruises or other injuries if you can. You can also take pictures of things your abuser has broken.  Teach your children that violence is never okay. Tell them that they do not deserve to be hurt.  Pack a bag   Prepare a bag with things you will need if you leave suddenly. Leave it with a friend, a relative, or someone else you trust. You could include the following items in the bag:  A set of keys to your home and car.  Emergency phone numbers and addresses.  Money such as cash or checks. You can also ask a friend, a relative, or someone else you trust to hold money for you.  Copies of legal documents such as house and car titles or rent receipts, birth certificates, Social Security card, voter registration, marriage and 's licenses, and your children's health records.  Personal items you would need for a few days, such as clothes, a toothbrush, toothpaste, and any medicines you or your children need.  A favorite toy or book for your child or children.  Diapers and bottles, if you have very young children.  Pictures that show signs of abuse and violence. You may also add pictures of your abuser.  If you leave   If you decide to leave, you can take the following steps:  Go to the emergency room at a hospital if you have been hurt.  Think about asking the police to be with you as you leave. They can protect you as you leave your home.  If you decide to leave secretly, remember that activities  "can be tracked. Your abuser may still have access to your cell phone, email, and credit cards. It may be possible for these to be traced. Always be aware of your surroundings.  If this is an emergency, do not worry about gathering up anything. Just leave--your safety is most important.  If your abuser moves out, change the locks on the doors. If you have a security system, change the access code.  When should you call for help?   Call 911 anytime you think you may need emergency care. For example, call if:    You or someone else has just been abused.     You think you or someone else is in danger of being abused.   Watch closely for changes in your health, and be sure to contact your doctor if you have any problems.  Where can you learn more?  Go to https://www.[employer].net/patiented  Enter A752 in the search box to learn more about \"Intimate Partner Violence Safety Instructions: Care Instructions.\"  Current as of: June 25, 2023               Content Version: 13.8    5613-9978 [employer].   Care instructions adapted under license by your healthcare professional. If you have questions about a medical condition or this instruction, always ask your healthcare professional. [employer] disclaims any warranty or liability for your use of this information.      Learning About Intimate Partner Violence  What is intimate partner violence?  Intimate partner violence is a type of domestic abuse. It's threatening, emotionally harmful, or violent behavior in a personal relationship. It can happen between past or current partners or spouses. In some relationships both people abuse each other. One partner may be more abusive. Or the abuse may be equal.  Abuse can affect people of any ethnic group, race, or Anabaptism. It can affect teens, adults, or the elderly. And it can happen to people of any sexual orientation, gender, or social status.  Abusers use fear, bullying, and threats to control their " partners. They may control what their partners do. They may control where their partners go or who they see. They may act jealous, controlling, or possessive. These early signs of abuse may happen soon after the start of the relationship. Sometimes it can be hard to notice abuse at first. But after the relationship becomes more serious, the abuse may get worse.  If you are being abused in your relationship, it's important to get help. The abuse is not your fault. You don't have to face it alone.  Be careful  It may not be safe to take home domestic abuse information like this handout. Some people ask a trusted friend to keep it for them. It's also important to plan ahead and to memorize the phone number of places you can go for help. If you are concerned about your safety, do not use your computer, smartphone, or tablet to read about domestic abuse.   What are the types of intimate partner violence?  Abuse can happen in different ways. Each type can happen on its own or in combination with others.  Emotional abuse  Emotional abuse is a pattern of threats, insults, or controlling behavior. It includes verbal abuse. It goes beyond healthy disagreements in a relationship. It's a sign of an unhealthy relationship.  Do you feel threatened, intimidated, or controlled?  Does your partner:  Threaten your children, other family members, or pets?  Use jokes meant to embarrass or shame you?  Call you names?  Tell you that you are a bad parent?  Threaten to take away your children?  Threaten to have you or your family members deported?  Control your access to money or other basic needs?  Control what you do, who you see or talk to, or where you go?  Another form of emotional abuse is denying that it is happening. Or the abuser may act like the abuse is no big deal or is your fault.  Sexual abuse  With sexual abuse, abusers may try to convince or force you to have sex. They may force you into sex acts you're not comfortable  with. Or they may sexually assault you. Sexual abuse can happen even if you are in a committed relationship.  Physical abuse  Physical abuse means that a partner hits, kicks, or does something else to physically hurt you. Physical abuse that starts with a slap might lead to kicking, shoving, and choking over time. The abuser may also threaten to hurt or kill you.  Stalking  Stalking means that an abuser gives you attention that you do not want and that causes you fear. Examples of stalking include:  Following you.  Showing up at places where the abuser isn't invited, such as at your work or school.  Constantly calling or texting you.  What problems can  to?  Intimate partner violence can be very dangerous. It can cause serious, repeated injury. It can even lead to death.  All forms of abuse can cause long-term health problems from the stress of a violent relationship. Verbal abuse can lead to sexual and physical abuse.  Abuse causes:  Emotional pain.  Depression.  Anxiety.  Post-traumatic stress.  Sexual abuse can lead to sexually transmitted infections (such as HIV/AIDS) and unplanned pregnancy.  Pregnancy can be a very dangerous time for people in abusive relationships. Abuse can cause or increase the risk of problems during pregnancy. These include low weight gain, anemia, infections, and bleeding. Abuse may also increase your baby's risk of low birth weight, premature birth, and death.  It can be hard for some victims of abuse to ask for help or to leave their relationship. You may feel scared, stuck, or not sure what steps to take. But it's important not to ignore abuse. Talking to someone you trust could be the first step to ending the abuse and taking care of your own health and happiness again. There are resources available that can help keep you safe.  Where can you get help?  Talk to a trusted friend. Find a local advocacy group, or talk to your doctor about the abuse.  Contact the National Domestic  "Violence Hotline at 1-112-202-SAFE (1-453.129.8943) for more safety tips. They can guide you to groups in your area that can help. Or go to the National Coalition Against Domestic Violence website at www.thehotline.org to learn more.  Domestic violence groups or a counselor in your area can help you make a safety plan for yourself and your children.  When to call for help  Call 911 anytime you think you may need emergency care. For example, call if:  You think that you or someone you know is in danger of being abused.  You have been hurt and can't have someone safely take you to emergency care.  You have just been abused.  A family member has just been abused.  Where can you learn more?  Go to https://www.[employer].net/patiented  Enter S665 in the search box to learn more about \"Learning About Intimate Partner Violence.\"  Current as of: June 25, 2023               Content Version: 13.8    8282-5971 [employer].   Care instructions adapted under license by your healthcare professional. If you have questions about a medical condition or this instruction, always ask your healthcare professional. [employer] disclaims any warranty or liability for your use of this information.      Vaginal Bleeding During Pregnancy: Care Instructions  Overview     It's common to have some vaginal spotting when you are pregnant. In some cases, the bleeding isn't serious. And there aren't any more problems with the pregnancy.  But sometimes bleeding is a sign of a more serious problem. This is more common if the bleeding is heavy or painful. Examples of more serious problems include miscarriage, an ectopic pregnancy, and a problem with the placenta.  You may have to see your doctor again to be sure everything is okay. You may also need more tests to find the cause of the bleeding.  Home treatment may be all you need. But it depends on what is causing the bleeding. Be sure to tell your doctor if you have any new " symptoms or if your symptoms get worse.  The doctor has checked you carefully, but problems can develop later. If you notice any problems or new symptoms, get medical treatment right away.  Follow-up care is a key part of your treatment and safety. Be sure to make and go to all appointments, and call your doctor if you are having problems. It's also a good idea to know your test results and keep a list of the medicines you take.  How can you care for yourself at home?  If your doctor prescribed medicines, take them exactly as directed. Call your doctor if you think you are having a problem with your medicine.  Do not have vaginal sex until your doctor says it's okay.  Do not put anything in your vagina until your doctor says it's okay.  Ask your doctor about other activities you can or can't do.  Get a lot of rest. Being pregnant can make you tired.  Do not use nonsteroidal anti-inflammatory drugs (NSAIDs), such as ibuprofen (Advil, Motrin), naproxen (Aleve), or aspirin, unless your doctor says it is okay.  When should you call for help?   Call 911 anytime you think you may need emergency care. For example, call if:    You passed out (lost consciousness).     You have severe vaginal bleeding. This means you are soaking through a pad each hour for 2 or more hours.     You have sudden, severe pain in your belly or pelvis.   Call your doctor now or seek immediate medical care if:    You have new or worse vaginal bleeding.     You are dizzy or lightheaded, or you feel like you may faint.     You have pain in your belly, pelvis, or lower back.     You think that you are in labor.     You have a sudden release of fluid from your vagina.     You've been having regular contractions for an hour. This means that you've had at least 8 contractions within 1 hour or at least 4 contractions within 20 minutes, even after you change your position and drink fluids.     You notice that your baby has stopped moving or is moving much  "less than normal.   Watch closely for changes in your health, and be sure to contact your doctor if you have any problems.  Where can you learn more?  Go to https://www.[employer].net/patiented  Enter N829 in the search box to learn more about \"Vaginal Bleeding During Pregnancy: Care Instructions.\"  Current as of: July 11, 2023               Content Version: 13.8    8441-3804 [employer].   Care instructions adapted under license by your healthcare professional. If you have questions about a medical condition or this instruction, always ask your healthcare professional. [employer] disclaims any warranty or liability for your use of this information.      "

## 2023-12-05 ENCOUNTER — TELEPHONE (OUTPATIENT)
Dept: OBGYN | Facility: CLINIC | Age: 28
End: 2023-12-05
Payer: COMMERCIAL

## 2023-12-05 NOTE — TELEPHONE ENCOUNTER
Called patient to follow up on PHQ-9 score of 10. Reached VM, LVM with clinic number to call back. Sent patient [employer] message as well.

## 2023-12-12 DIAGNOSIS — Z32.01 PREGNANCY TEST POSITIVE: Primary | ICD-10-CM

## 2023-12-17 LAB
ABO/RH(D): NORMAL
ANTIBODY SCREEN: NEGATIVE
SPECIMEN EXPIRATION DATE: NORMAL

## 2023-12-18 ENCOUNTER — TRANSCRIBE ORDERS (OUTPATIENT)
Dept: MATERNAL FETAL MEDICINE | Facility: CLINIC | Age: 28
End: 2023-12-18

## 2023-12-18 ENCOUNTER — ANCILLARY PROCEDURE (OUTPATIENT)
Dept: ULTRASOUND IMAGING | Facility: CLINIC | Age: 28
End: 2023-12-18
Payer: COMMERCIAL

## 2023-12-18 ENCOUNTER — PRENATAL OFFICE VISIT (OUTPATIENT)
Dept: OBGYN | Facility: CLINIC | Age: 28
End: 2023-12-18
Attending: ADVANCED PRACTICE MIDWIFE
Payer: COMMERCIAL

## 2023-12-18 ENCOUNTER — LAB (OUTPATIENT)
Dept: LAB | Facility: CLINIC | Age: 28
End: 2023-12-18
Payer: COMMERCIAL

## 2023-12-18 VITALS
HEART RATE: 77 BPM | SYSTOLIC BLOOD PRESSURE: 94 MMHG | WEIGHT: 148 LBS | BODY MASS INDEX: 27.23 KG/M2 | HEIGHT: 62 IN | DIASTOLIC BLOOD PRESSURE: 60 MMHG

## 2023-12-18 DIAGNOSIS — Z36.89 ENCOUNTER FOR FETAL ANATOMIC SURVEY: ICD-10-CM

## 2023-12-18 DIAGNOSIS — E55.9 VITAMIN D DEFICIENCY: ICD-10-CM

## 2023-12-18 DIAGNOSIS — Z13.71 SCREENING FOR GENETIC DISEASE CARRIER STATUS: ICD-10-CM

## 2023-12-18 DIAGNOSIS — Z34.90 NORMAL INTRAUTERINE PREGNANCY, ANTEPARTUM: ICD-10-CM

## 2023-12-18 DIAGNOSIS — Z34.91 NORMAL PREGNANCY IN FIRST TRIMESTER: ICD-10-CM

## 2023-12-18 DIAGNOSIS — O26.90 PREGNANCY RELATED CONDITION, ANTEPARTUM: Primary | ICD-10-CM

## 2023-12-18 DIAGNOSIS — Z32.01 PREGNANCY TEST POSITIVE: ICD-10-CM

## 2023-12-18 DIAGNOSIS — Z34.91 NORMAL PREGNANCY IN FIRST TRIMESTER: Primary | ICD-10-CM

## 2023-12-18 LAB
BASOPHILS # BLD AUTO: 0 10E3/UL (ref 0–0.2)
BASOPHILS NFR BLD AUTO: 0 %
EOSINOPHIL # BLD AUTO: 0.1 10E3/UL (ref 0–0.7)
EOSINOPHIL NFR BLD AUTO: 1 %
ERYTHROCYTE [DISTWIDTH] IN BLOOD BY AUTOMATED COUNT: 12.3 % (ref 10–15)
GLUCOSE 1H P 50 G GLC PO SERPL-MCNC: 82 MG/DL (ref 70–129)
HBA1C MFR BLD: 4.6 %
HCT VFR BLD AUTO: 40.1 % (ref 35–47)
HGB BLD-MCNC: 13.3 G/DL (ref 11.7–15.7)
IMM GRANULOCYTES # BLD: 0 10E3/UL
IMM GRANULOCYTES NFR BLD: 0 %
LYMPHOCYTES # BLD AUTO: 1.6 10E3/UL (ref 0.8–5.3)
LYMPHOCYTES NFR BLD AUTO: 29 %
MCH RBC QN AUTO: 29.3 PG (ref 26.5–33)
MCHC RBC AUTO-ENTMCNC: 33.2 G/DL (ref 31.5–36.5)
MCV RBC AUTO: 88 FL (ref 78–100)
MONOCYTES # BLD AUTO: 0.3 10E3/UL (ref 0–1.3)
MONOCYTES NFR BLD AUTO: 5 %
NEUTROPHILS # BLD AUTO: 3.5 10E3/UL (ref 1.6–8.3)
NEUTROPHILS NFR BLD AUTO: 65 %
NRBC # BLD AUTO: 0 10E3/UL
NRBC BLD AUTO-RTO: 0 /100
PLATELET # BLD AUTO: 256 10E3/UL (ref 150–450)
RBC # BLD AUTO: 4.54 10E6/UL (ref 3.8–5.2)
VIT D+METAB SERPL-MCNC: 23 NG/ML (ref 20–50)
WBC # BLD AUTO: 5.4 10E3/UL (ref 4–11)

## 2023-12-18 PROCEDURE — 99213 OFFICE O/P EST LOW 20 MIN: CPT | Mod: 25 | Performed by: ADVANCED PRACTICE MIDWIFE

## 2023-12-18 PROCEDURE — 86803 HEPATITIS C AB TEST: CPT

## 2023-12-18 PROCEDURE — 86706 HEP B SURFACE ANTIBODY: CPT

## 2023-12-18 PROCEDURE — 86780 TREPONEMA PALLIDUM: CPT

## 2023-12-18 PROCEDURE — 87389 HIV-1 AG W/HIV-1&-2 AB AG IA: CPT

## 2023-12-18 PROCEDURE — 87340 HEPATITIS B SURFACE AG IA: CPT

## 2023-12-18 PROCEDURE — 36415 COLL VENOUS BLD VENIPUNCTURE: CPT

## 2023-12-18 PROCEDURE — 86850 RBC ANTIBODY SCREEN: CPT

## 2023-12-18 PROCEDURE — 76801 OB US < 14 WKS SINGLE FETUS: CPT

## 2023-12-18 PROCEDURE — 86901 BLOOD TYPING SEROLOGIC RH(D): CPT

## 2023-12-18 PROCEDURE — 82306 VITAMIN D 25 HYDROXY: CPT

## 2023-12-18 PROCEDURE — 85025 COMPLETE CBC W/AUTO DIFF WBC: CPT

## 2023-12-18 PROCEDURE — 83036 HEMOGLOBIN GLYCOSYLATED A1C: CPT

## 2023-12-18 PROCEDURE — 86762 RUBELLA ANTIBODY: CPT

## 2023-12-18 PROCEDURE — 86787 VARICELLA-ZOSTER ANTIBODY: CPT

## 2023-12-18 PROCEDURE — 76801 OB US < 14 WKS SINGLE FETUS: CPT | Mod: 26 | Performed by: OBSTETRICS & GYNECOLOGY

## 2023-12-18 PROCEDURE — 99207 PR PRENATAL VISIT: CPT | Performed by: ADVANCED PRACTICE MIDWIFE

## 2023-12-18 PROCEDURE — 82950 GLUCOSE TEST: CPT

## 2023-12-18 RX ORDER — ONDANSETRON 8 MG/1
8 TABLET, FILM COATED ORAL EVERY 8 HOURS PRN
Qty: 14 TABLET | Refills: 3 | Status: SHIPPED | OUTPATIENT
Start: 2023-12-18 | End: 2024-06-05

## 2023-12-18 ASSESSMENT — PAIN SCALES - GENERAL: PAINLEVEL: NO PAIN (0)

## 2023-12-18 NOTE — LETTER
December 18, 2023    To Whom It May Concern:    Irma Weinberg is being seen in our clinic for prenatal care.      Her Estimated Date of Delivery: Jul 14, 2024.        LMP:  Patient's last menstrual period was 10/08/2023 (exact date).     Sincerely,        Collette Dillon CNM

## 2023-12-18 NOTE — LETTER
December 18, 2023    To Whom It May Concern:    Irma Weinberg is being seen in our clinic for prenatal care.      Her Estimated Date of Delivery: Data Unavailable.    The first day the third trimester:***    LMP:  Patient's last menstrual period was 10/08/2023 (exact date).     Sincerely,        {[facility] PROVIDERS :[id number]}

## 2023-12-19 DIAGNOSIS — E55.9 VITAMIN D DEFICIENCY: Primary | ICD-10-CM

## 2023-12-19 LAB
HBV SURFACE AB SERPL IA-ACNC: 0.83 M[IU]/ML
HBV SURFACE AB SERPL IA-ACNC: NONREACTIVE M[IU]/ML
HBV SURFACE AG SERPL QL IA: NONREACTIVE
HCV AB SERPL QL IA: NONREACTIVE
HIV 1+2 AB+HIV1 P24 AG SERPL QL IA: NONREACTIVE
RUBV IGG SERPL QL IA: 1.29 INDEX
RUBV IGG SERPL QL IA: POSITIVE
T PALLIDUM AB SER QL: NONREACTIVE
VZV IGG SER QL IA: 494.4 INDEX
VZV IGG SER QL IA: POSITIVE

## 2023-12-20 NOTE — PROGRESS NOTES
S Provider New OB Note    Reviewed RN intake note.    Irma is a 28 year old female who presents to clinic for a new OB visit.   at 10w1d with Estimated Date of Delivery: 2024 based on LMP. Feels well. Has started PNV.   She has not had bleeding since her LMP.   She has had mild nausea. Weight loss has not occurred. Has been taking Meclazine with some relief.  Would like Zofran.  This was a planned pregnancy.   Partner is involved,  Vic, PhD in Pharmacy program.  Patient is Pharmacist   OTHER CONCERNS: none      PSYCHIATRIC:  Denies mood changes.      PHQ-9 score:        2023    12:49 PM   PHQ-9 SCORE   PHQ-9 Total Score MyChart 10 (Moderate depression)   PHQ-9 Total Score 10               2023    12:51 PM   NEGRO-7 SCORE   Total Score 3 (minimal anxiety)   Total Score 3         Past History:  Her past medical history History reviewed. No pertinent past medical history..   This is her first pregnancy  Since her last LMP she denies use of alcohol, tobacco and street drugs.  HISTORY:  Family History   Problem Relation Age of Onset    Diabetes Mother         Type 2    Coronary Artery Disease Father     Hypertension Father     Rheumatoid Arthritis Father     No Known Problems Sister     No Known Problems Sister     Diabetes Maternal Grandmother         type 2 diabetes    Diabetes Maternal Grandfather         type 2    Diabetes Paternal Grandmother         type 2    No Known Problems Paternal Grandfather      Social History     Socioeconomic History    Marital status:      Spouse name: None    Number of children: None    Years of education: None    Highest education level: None   Tobacco Use    Smoking status: Never    Smokeless tobacco: Never   Vaping Use    Vaping Use: Never used   Substance and Sexual Activity    Alcohol use: Never    Drug use: Never    Sexual activity: Yes     Partners: Male     Current Outpatient Medications   Medication Sig    calcium carbonate (TUMS) 500 MG chewable  "tablet Take 1 chew tab by mouth as needed for heartburn    meclizine (ANTIVERT) 25 MG tablet Take 25 mg by mouth at bedtime    ondansetron (ZOFRAN) 8 MG tablet Take 1 tablet (8 mg) by mouth every 8 hours as needed for nausea    Prenatal Vit-Fe Fumarate-FA (PRENATAL MULTIVITAMIN  PLUS IRON) 27-1 MG TABS Take 1 tablet by mouth daily    folic acid (FOLVITE) 400 MCG tablet Take 400 mcg by mouth daily    vitamin D3 (CHOLECALCIFEROL) 125 MCG (5000 UT) tablet Take 1 tablet (125 mcg) by mouth daily     No current facility-administered medications for this visit.     No Known Allergies    ============================================  MEDICAL HISTORY  History reviewed. No pertinent past medical history.  History reviewed. No pertinent surgical history.    OB History    Para Term  AB Living   1 0 0 0 0 0   SAB IAB Ectopic Multiple Live Births   0 0 0 0 0      # Outcome Date GA Lbr Louie/2nd Weight Sex Delivery Anes PTL Lv   1 Current               Obstetric Comments   *First Pregnancy            Reviewed genetic history form       GYN History- Denies Abnormal Pap Smears                        Cervical procedures: no                        History of STI: no    I personally reviewed the past social/family/medical and surgical history on the date of service.     OBJECTIVE:  BP 94/60   Pulse 77   Ht 1.58 m (5' 2.21\")   Wt 67.1 kg (148 lb)   LMP 10/08/2023 (Exact Date)   BMI 26.89 kg/m    ROS: 10 point ROS neg other than the symptoms noted above in the HPI.    EXAM:  BP 94/60   Pulse 77   Ht 1.58 m (5' 2.21\")   Wt 67.1 kg (148 lb)   LMP 10/08/2023 (Exact Date)   BMI 26.89 kg/m     EXAM:  GENERAL:  Pleasant pregnant female, alert, cooperative and well groomed.  SKIN:  Warm and dry, without lesions or rashes  HEAD: Symmetrical features.  MOUTH:  Buccal mucosa pink, moist without lesions.  Teeth in good repair.    NECK:  Thyroid without enlargement and nodules.  Lymph nodes not palpable.   LUNGS:  Clear to " auscultation.  BREAST:    deferred   HEART:  RRR without murmur.  MUSCULOSKELETAL:  Full range of motion  EXTREMITIES:  No edema. No significant varicosities.   PELVIC EXAM: deferred, patient reports pap smear and negative GC/CT at [place] this year.      Recommended Flu Vaccine.  Patient already received Flu Vaccine      ASSESSMENT:  28 year old , 10w2d weeks of pregnancy with JAYA of 2024 by LMP  Intrauterine pregnancy 10w2d size consistent with dates  Genetic Screening: First Trimester Screen    ICD-10-CM    1. Normal pregnancy in first trimester  Z34.91 ABO/Rh Type and Screen     CBC with Platelets Differential     Hepatitis B Surface Antigen     HIV Antigen Antibody Combo     Rubella Antibody IgG     Treponema Abs w Reflex to RPR and Titer     Varicella Zoster Virus Antibody IgG     Hepatitis C antibody     Hemoglobin A1c     Hepatitis B Surface Antibody     ondansetron (ZOFRAN) 8 MG tablet     Urine Culture Aerobic Bacterial     Glucose 1 Hour     CANCELED: Urine Culture Aerobic Bacterial      2. Vitamin D deficiency  E55.9 25- OH-Vitamin D      3. Encounter for fetal anatomic survey  Z36.89 [facility] Referral - Pregnancy      4. Screening for genetic disease carrier status  Z13.71 [facility] Referral - Pregnancy      5. Normal intrauterine pregnancy, antepartum  Z34.90           Irma was seen today for prenatal care.    Diagnoses and all orders for this visit:    Normal pregnancy in first trimester  -     ABO/Rh Type and Screen; Future  -     CBC with Platelets Differential; Future  -     Hepatitis B Surface Antigen; Future  -     HIV Antigen Antibody Combo; Future  -     Rubella Antibody IgG; Future  -     Treponema Abs w Reflex to RPR and Titer; Future  -     Cancel: Urine Culture Aerobic Bacterial; Future  -     Varicella Zoster Virus Antibody IgG; Future  -     Hepatitis C antibody; Future  -     Hemoglobin A1c; Future  -     Hepatitis B Surface Antibody; Future  -      ondansetron (ZOFRAN) 8 MG tablet; Take 1 tablet (8 mg) by mouth every 8 hours as needed for nausea  -     Urine Culture Aerobic Bacterial; Future  -     Glucose 1 Hour; Future    Vitamin D deficiency  -     25- OH-Vitamin D; Future    Encounter for fetal anatomic survey  -     [facility] Referral - Pregnancy; Future    Screening for genetic disease carrier status  -     [facility] Referral - Pregnancy; Future    Normal intrauterine pregnancy, antepartum          Orders Placed This Encounter   Procedures    25- OH-Vitamin D    Hepatitis B Surface Antigen    HIV Antigen Antibody Combo    Rubella Antibody IgG    Treponema Abs w Reflex to RPR and Titer    Varicella Zoster Virus Antibody IgG    Hepatitis C antibody    Hemoglobin A1c    Hepatitis B Surface Antibody    Glucose 1 Hour    [facility] Referral - Pregnancy    ABO/Rh Type and Screen    CBC with Platelets Differential        PLAN:  - Reviewed use of triage nurse line and contacting the on-call provider after hours for an urgent need such as fever, vagina bleeding, bladder or vaginal infection, rupture of membranes,  or term labor.    -Ordered routine prenatal lab work.     - Reviewed best evidence for: weight gain for her weight and height for pregnancy:  Based on pre-pregnancy Body mass index is 26.89 kg/m . RECOMMENDED WEIGHT GAIN: 25-35 lbs.    - Reviewed healthy diet and foods to avoid, exercise and activity during pregnancy; avoiding exposure to toxoplasmosis; and maintenance of a generally healthy lifestyle.   - Discussed the harms, benefits, side effects and alternative therapies for current prescribed and OTC medications. Patient was encouraged to start/continue prenatal vitamins as tolerated.    - Oriented to Practice, types of care, and how to reach a provider.  Pt prefers Undecided between CNM and MD, will continue to consider.     - Patient received 1st trimester new OB education packet complete with aide of [facility] booklet including information on genetic screening test options.  - Patient desires 1st trimester screening and desires level II ultrasound which were ordered.    - Reviewed risk for gestational diabetes d/t First degree relative with GDM or DM , IS agreeable to early 1 hour today.    - Reviewed risk for Pre Eclampsia d/t No known risk factors of High risk for Pre E or meets two or more of the moderate risk factors including Nulliparity  and IS NOTagreeable to starting 81mg aspirin daily after 12 weeks .    - The patient  does not have a history of spontaneous  birth so  WILL NOT consider serial transvaginal cervical length ultrasounds from 16-24 weeks.     -The patient does not have a history of immunosuppresion or HIV so Toxoplasma IgG/IgM WILL NOT be ordered.    -Assess risk for asymptomatic latent TB (prior infection, recent immigrant from epidemic areas, immunosuppression, living in overcrowded environment):   WILL NOT have PPD skin test or Quantiferon-TB Gold Plus blood draw. *both options valid*      - All pt's and partner's  questions discussed and answered.  Pt verbalized understanding of and agreement to plan of care.     - Continue scheduled prenatal care and prn if questions or concerns    ALFREDO Hitchcock CNM

## 2023-12-31 ENCOUNTER — HEALTH MAINTENANCE LETTER (OUTPATIENT)
Age: 28
End: 2023-12-31

## 2024-01-09 ENCOUNTER — PRE VISIT (OUTPATIENT)
Dept: MATERNAL FETAL MEDICINE | Facility: CLINIC | Age: 29
End: 2024-01-09
Payer: COMMERCIAL

## 2024-01-12 ENCOUNTER — HOSPITAL ENCOUNTER (OUTPATIENT)
Dept: ULTRASOUND IMAGING | Facility: CLINIC | Age: 29
Discharge: HOME | End: 2024-01-12
Attending: OBSTETRICS & GYNECOLOGY
Payer: COMMERCIAL

## 2024-01-12 ENCOUNTER — OFFICE VISIT (OUTPATIENT)
Dept: MATERNAL FETAL MEDICINE | Facility: CLINIC | Age: 29
End: 2024-01-12
Attending: OBSTETRICS & GYNECOLOGY
Payer: COMMERCIAL

## 2024-01-12 ENCOUNTER — LAB (OUTPATIENT)
Dept: LAB | Facility: CLINIC | Age: 29
End: 2024-01-12
Attending: OBSTETRICS & GYNECOLOGY
Payer: COMMERCIAL

## 2024-01-12 ENCOUNTER — MEDICAL CORRESPONDENCE (OUTPATIENT)
Dept: HEALTH INFORMATION MANAGEMENT | Facility: CLINIC | Age: 29
End: 2024-01-12

## 2024-01-12 DIAGNOSIS — Z36.9 FIRST TRIMESTER SCREENING: Primary | ICD-10-CM

## 2024-01-12 DIAGNOSIS — O26.90 PREGNANCY RELATED CONDITION, ANTEPARTUM: ICD-10-CM

## 2024-01-12 DIAGNOSIS — Z36.9 ANTENATAL SCREENING ENCOUNTER: Primary | ICD-10-CM

## 2024-01-12 DIAGNOSIS — Z36.9 ANTENATAL SCREENING ENCOUNTER: ICD-10-CM

## 2024-01-12 PROCEDURE — 36415 COLL VENOUS BLD VENIPUNCTURE: CPT

## 2024-01-12 PROCEDURE — 76813 OB US NUCHAL MEAS 1 GEST: CPT | Mod: 26 | Performed by: OBSTETRICS & GYNECOLOGY

## 2024-01-12 PROCEDURE — 96040 HC GENETIC COUNSELING, EACH 30 MINUTES: CPT

## 2024-01-12 PROCEDURE — 76813 OB US NUCHAL MEAS 1 GEST: CPT

## 2024-01-12 NOTE — PROGRESS NOTES
"Please see \"Imaging\" tab under \"Chart Review\" for details of today's US at the [facility].    Jose Luis Milligan MD  Maternal-Fetal Medicine    "

## 2024-01-12 NOTE — PROGRESS NOTES
[facility]  Genetic Counseling Consult    Patient:  Irma Weinberg YOB: 1995   Date of Service:  1/12/24   MRN: [id number]    Irma was seen at the [facility] for genetic consultation. The indication for genetic counseling is desire to discuss options for genetic screening and diagnostics. The patient was accompanied to this visit by their partner Vic.     The session was conducted in English.        IMPRESSION/ PLAN   1. Irma has not had genetic screening in this pregnancy but elected to have screening today.     2. During today's [facility] visit, Irma had a blood draw for Expanded NIPS through [facility]. The Expanded NIPS includes screening for trisomy 21, 18, and 13 and 22q11.2 deletion syndrome and the patient opted to screen for sex chromosome aneuploidies, including reported fetal sex. In accordance with current guidelines, other microdeletion syndromes and rare autosomal trisomies were not included, but reflex to include these conditions remains available with Expanded NIPS if there is an indication later in the pregnancy. Results are expected in 7-14 days. The patient will be called with results and if they do not answer they requested a detailed message with results on their voicemail, including the predicted fetal sex information.  Patient was informed that results, including fetal sex, will be available in [email or web address].     3. Since the patient chose aneuploidy screening via NIPT, quad screen is NOT recommended in the second trimester. If the patient desires screening for open neural tube defects, maternal serum AFP only is recommended, ideally between 16-18 weeks gestation.    4. Irma had a nuchal translucency ultrasound today. Please see the ultrasound report for further details.    5. Further recommendations include a fetal anatomy ultrasound around 18-20 weeks. Irma is scheduled for a level II  "comprehensive anatomy ultrasound with [facility] on 2024.     PREGNANCY HISTORY   /Parity:       This is Irma's first pregnancy.      CURRENT PREGNANCY   Current Age: 28 year old   Age at Delivery: 28 year old  JAYA: 2024, by Last Menstrual Period                                   Gestational Age: 13w5d  This pregnancy is a single gestation.   This pregnancy was conceived spontaneously.    MEDICAL HISTORY   Irma s reported medical history is not expected to impact pregnancy management or risks to fetal development.       FAMILY HISTORY   A three-generation pedigree was obtained today and is scanned under the \"Media\" tab in Epic. The family history was reported by Irma and their partner.    The following significant findings were reported today:   The father of the pregnancy, Vic, is 30 and healthy.   Vic reports that a maternal first cousin has Down syndrome. We discussed that about 95% of cases with Down syndrome are sporadic, meaning they only affect that pregnancy and there is no family history. This type of Down syndrome is caused by trisomy 21 due to non-disjunction, and is likely the explanation for the patient's reported family history. We also reviewed that there is a less common type of Down syndrome that can be inherited and could put family members at an increased risk for Down syndrome. This type of Down syndrome is caused by a chromosomal translocation in which a part of chromosome 21 is attached to another chromosome. The patient's reported family history is not highly suggestive for the inherited form of Down syndrome (ie. Due to a Robertsonian Translocation).   Vic reports that a maternal first cousin has a congenital heart defect which was surgically repaired. This cousin is otherwise healthy and had typical development. We discussed that congenital heart defects are common, occurring in 0.5 to 1.0% live births. Congenital heart defects can be isolated or part of a broader " genetic syndrome. When isolated, congenital heart defects are usually multifactorial, meaning they are often caused by a combination of genetic and environmental factors. In general, the recurrence risk is likely increased for first and second degree relatives of an individual with a congenital heart defect. This pregnancy is a foruth degree relative to the family member with a congenital heart defect; therefore, the recurrence risk is likely equal to the population risk.    Irma reports that she has a maternal first cousin with developmental delays which have been attributed to lack of oxygen during labor/delivery.     Otherwise, the reported family history is unremarkable for multiple miscarriages, stillbirths, birth defects, intellectual disabilities, known genetic conditions, and consanguinity.       CARRIER SCREENING   Expanded carrier screening is available to screen for autosomal recessive conditions and X-linked conditions in a large list of genes. Carrier screening does not test the pregnancy but gives a risk assessment for the pregnancy and future pregnancies to have the condition. Expanded carrier screening is designed to identify carrier status for conditions that are primarily childhood or adolescent onset. Expanded carrier screening does not evaluate for adult-onset conditions such as hereditary cancer syndromes, dementia/ Alzheimer's disease, or cardiovascular disease risk factors. Additionally, expanded carrier screening is not comprehensive for all known genetic diseases or inherited conditions. Carrier screening does not test for all genetic and health conditions or risk factors.     Autosomal recessive conditions happen when a mutation has been inherited from the egg and sperm and include conditions like cystic fibrosis, thalassemia, hearing loss, spinal muscular atrophy, and more. We reviewed that when both biological parents carry a harmful genetic change in a gene associated with autosomal  recessive inheritance, each of their pregnancies has a 1 in 4 (25%) chance to be affected by that condition. X-linked conditions happen when a mutation has been inherited from the egg and include conditions like fragile X syndrome.With x-linked conditions, the specific risk generally depends on the chromosomal sex of the fetus, with XY individuals (generally male) being most severely affected.      screening was reviewed. [facility] [place] Screening    The patient does NOT have a family history of known inherited conditions. This does NOT mean the patient and/or their partner is not a carrier of a condition. Approximately 90% of couples at an increased reproductive risk for an inherited condition have no family history of that condition.     The patient has not had carrier screening previously.     We reviewed availability of expanded carrier screening through [facility] for up to 558 conditions. The following was reviewed with Last:    If both parents are carriers of an autosomal recessive condition, there are three possible outcomes for each pregnancy/child: 25% unaffected, 50% carrier, and 25% affected. The only method to determine the outcome or diagnose the condition in a pregnancy is an invasive testing option such as an amniocentesis. Some genetic conditions will have ultrasound findings during the pregnancy but many do not. Some couples will choose the diagnostic testing to plan for delivery or choose termination of an affected pregnancy. Other couples will choose to test for the condition after delivery. While these conditions cannot be cured or treated during the pregnancy it may guide management recommendations (ultrasounds) for pregnancy as well as delivery and infant care.    The patient was not certain about whether to pursue carrier screening today. They were provided with a pamphlet and my contact information and will contact me if they would like to pursue screening.     RISK  ASSESSMENT FOR CHROMOSOME CONDITIONS   We explained that the risk for fetal chromosome abnormalities increases with maternal age. We discussed specific features of common chromosome abnormalities, including Down syndrome, trisomy 13, trisomy 18, and sex chromosome trisomies.    At age 28 at midtrimester, the risk to have a baby with Down syndrome is 1 in 928.   At age 28 at midtrimester, the risk to have a baby with any chromosome abnormality is 1 in 464.     Irma has not had genetic screening in this pregnancy but elected to have screening today.      GENETIC TESTING OPTIONS   Genetic testing during a pregnancy includes screening and diagnostic procedures.      Screening tests are non-invasive which means no risk to the pregnancy and includes ultrasounds and blood work. The benefits and limitations of screening were reviewed. Screening tests provide a risk assessment (chance) specific to the pregnancy for certain fetal chromosome abnormalities but cannot definitively diagnose or exclude a fetal chromosome abnormality. Follow-up genetic counseling and consideration of diagnostic testing is recommended with any abnormal screening result. Diagnostic testing during a pregnancy is more certain and can test for more conditions. However, the tests do have a risk of miscarriage that requires careful consideration. These tests can detect fetal chromosome abnormalities with greater than 99% certainty. Results can be compromised by maternal cell contamination or mosaicism and are limited by the resolution of current genetic testing technology.     There is no screening or diagnostic test that detects all forms of birth defects or intellectual disability.     We discussed the following screening options:   Non-invasive prenatal testing (NIPT)  Also called cell-free DNA screening because it detects chromosomes from the placenta in the pregnant person's blood  Can be done any time after 10 weeks gestation  Standard  recommendation for NIPT screens for trisomy 21, trisomy 18, trisomy 13, with the option of adding sex chromosome aneuploidies, without or without predicted sex  Current (2023) ACMG guidelines also recommend that screening for one microdeletion syndrome, called 22q11.2 deletion syndrome be offered to all pregnant patients. 22q11.2 deletion syndrome has an estimated prevalence of 1 in 990 to 1 in 2148 (0.05-0.1%). Risk is not thought to increase with maternal age. Clinical features are variable but include congenital heart defects, cleft palate, developmental delays, immune system deficiencies, and hearing loss. Approximately 90% of cases are de luzmaria (a sporadic new change in a pregnancy). Cell-free DNA screening for 22q11.2 deletion syndrome is available (Expanded NIPS through [employer]). We discussed the limitations of cell-free DNA screening in detecting microdeletions and the possiblity of false positives and false negatives. Expanded NIPS also allows for reflex to include other microdeletion conditions and rare autosomal trisomies if an indication would arise later in the pregnancy. The patient opted into 22q11.2 deletion syndrome screening as part of the expanded NIPT option.   Newer NIPT options are also available that include screening for other rare autosomal trisomies, microdeletion/duplication syndromes, certain single-gene autosomal recessive and autosomal dominant conditions, and fetal blood antigens. Guidelines do not recommend these conditions are included in standard screening. These options have limitations and should be discussed with a genetic counselor.   Cannot screen for open neural tube defects, maternal serum AFP after 15 weeks is recommended  There is a small chance of a no-call result or an incidental finding related to fetal or maternal health.   We discussed that [employer] will bill the patient's insurance for testing and will contact the patient if the expected out-of-pocket cost is  expected to be greater than $100 to discuss options. The patient was also provided with an [employer] billing postcard and encouraged to access [employer]'s online  tool and/or contact [employer] billing office directly if they have additional questions or concerns regarding billing. The patient expressed her understanding.     We discussed the following ultrasound options:  Nuchal translucency (NT) ultrasound  Ultrasound between [id number] that includes nuchal translucency measurement and nasal bone assessment  Nuchal translucency refers to the space at the back of the neck where fluid builds up. All babies at this stage have fluid and there is only concern if there is too much fluid  Nasal bone refers to the small bone in the nose. There is concern for conditions like Down syndrome if the bone cannot be seen at all  This ultrasound can be done as part of first trimester screening, at the same time as another screen (NIPT), at the same time as a [facility], or if the patient does not want genetic screening.  Markers on ultrasound detects about 70% of pregnancies with aneuploidy  Abnormalities on NT ultrasound can also increase the risk for a birth defect, like a heart defect    Comprehensive level II ultrasound (Fetal Anatomy Ultrasound)  Ultrasound done between 18-20 weeks gestation  Screens for major birth defects and markers for aneuploidy (like trisomy 21 and trisomy 18)  Includes assessment of the fetal growth, internal organs, placenta, and amniotic fluid    We discussed the following diagnostic options:   Chorionic villus sampling (CVS)  Invasive diagnostic procedure done between 10w0d and 13w6d  The procedure collects a small sample from the placenta for the purpose of chromosomal testing and/or other genetic testing  Diagnostic result; more than 99% sensitivity for fetal chromosome abnormalities  Cannot screen for open neural tube defects, maternal serum AFP after 15 weeks is  recommended    Amniocentesis  Invasive diagnostic procedure done after 15 weeks gestation  The procedure collects a small sample of amniotic fluid for the purpose of chromosomal testing and/or other genetic testing  Diagnostic result; more than 99% sensitivity for fetal chromosome abnormalities  Testing for AFP in the amniotic fluid can test for open neural tube defects      It was a pleasure to be involved with [facility] care. Face-to-face time of the meeting was 30 minutes.    JENNIFER Coats, Merged with [facility]  Certified and [place] Licensed Genetic Counselor  [facility]  Maternal Fetal Medicine  Office: [phone]  [facility]: [phone]   Fax: [phone]  [facility] [place]

## 2024-01-12 NOTE — PATIENT INSTRUCTIONS

## 2024-01-15 ENCOUNTER — PRENATAL OFFICE VISIT (OUTPATIENT)
Dept: OBGYN | Facility: CLINIC | Age: 29
End: 2024-01-15
Attending: ADVANCED PRACTICE MIDWIFE
Payer: COMMERCIAL

## 2024-01-15 VITALS
HEART RATE: 67 BPM | DIASTOLIC BLOOD PRESSURE: 70 MMHG | BODY MASS INDEX: 27.79 KG/M2 | SYSTOLIC BLOOD PRESSURE: 103 MMHG | WEIGHT: 151 LBS | HEIGHT: 62 IN

## 2024-01-15 DIAGNOSIS — Z78.9 NONIMMUNE TO HEPATITIS B VIRUS: ICD-10-CM

## 2024-01-15 DIAGNOSIS — Z34.90 NORMAL INTRAUTERINE PREGNANCY, ANTEPARTUM: Primary | ICD-10-CM

## 2024-01-15 PROCEDURE — 99207 PR PRENATAL VISIT: CPT | Performed by: ADVANCED PRACTICE MIDWIFE

## 2024-01-15 PROCEDURE — 99213 OFFICE O/P EST LOW 20 MIN: CPT | Performed by: ADVANCED PRACTICE MIDWIFE

## 2024-01-15 ASSESSMENT — PAIN SCALES - GENERAL: PAINLEVEL: MODERATE PAIN (5)

## 2024-01-15 NOTE — PROGRESS NOTES
"Subjective:      28 year old  at 14w1d presents for a routine prenatal appointment. Here w    NO vaginal bleeding or leakage of fluid.  NO contractions or cramping.    POS fetal movement.       No HA, visual changes, RUQ or epigastric pain.   The patient presents with the following concerns:nose bleeds, discussed saline spray  Level II US  Scheduled.        Objective:  Vitals:    01/15/24 1447   BP: 103/70   Pulse: 67   Weight: 68.5 kg (151 lb)   Height: 1.58 m (5' 2.21\")     See OB flowsheet    Assessment/Plan     Encounter Diagnoses   Name Primary?    Normal intrauterine pregnancy, antepartum Yes    Nonimmune to hepatitis B virus      No orders of the defined types were placed in this encounter.    No orders of the defined types were placed in this encounter.      - Reviewed total weight gain, encouraged continued healthy diet and exercise.      - Reviewed why/how to contact provider.    Patient education/orders or handouts today:  Level 2 u/s scheduled   Return to clinic in 4 weeks and prn if questions or concerns.   Discussed strategies for nose bleeds and other early preg symptoms  Trini Harman, APRN CNM                  "

## 2024-01-16 ENCOUNTER — TELEPHONE (OUTPATIENT)
Dept: MATERNAL FETAL MEDICINE | Facility: CLINIC | Age: 29
End: 2024-01-16
Payer: COMMERCIAL

## 2024-01-16 LAB — SCANNED LAB RESULT: NORMAL

## 2024-01-16 NOTE — Clinical Note
Irma Weinberg low risk NIPT results disclosure note from January 16, 2024.  Alice Villafuerte, [facility], [facility] Licensed Genetic Counselor [facility] Maternal Fetal Medicine Phone: [phone] [email or web address]

## 2024-01-16 NOTE — TELEPHONE ENCOUNTER
January 16, 2024    I spoke with Irma and her  Vic regarding her NIPT results.     Results indicate NO ANEUPLOIDY DETECTED for chromosomes 21, 18, 13, or sex chromosomes. Results are also low risk for 22q11.2 deletion syndrome. Male fetal sex was disclosed per patient request.     This puts her current pregnancy at low risk for Down syndrome, trisomy 18, trisomy 13 and sex chromosome abnormalities. This test is reported to have the following sensitivities: Down syndrome: 99.99%, trisomy 18: 99.99%, and trisomy 13: 99.99%. Although these results are reassuring, this does not replace a standard chromosome analysis from a chorionic villus sampling or amniocentesis.     Level II ultrasound is scheduled for 2/12/2024.     MSAFP is the appropriate second trimester screening test for open neural tube defects; the maternal quad screen is not recommended.    Her results are available in her Epic chart for her primary OB to review.      Alice Villafuerte, [facility], [facility]  Licensed Genetic Counselor  [facility]  Maternal Fetal Medicine  Phone: [phone]  [email or web address]

## 2024-02-12 ENCOUNTER — HOSPITAL ENCOUNTER (OUTPATIENT)
Dept: ULTRASOUND IMAGING | Facility: CLINIC | Age: 29
Discharge: HOME | End: 2024-02-12
Attending: OBSTETRICS & GYNECOLOGY
Payer: COMMERCIAL

## 2024-02-12 ENCOUNTER — PRENATAL OFFICE VISIT (OUTPATIENT)
Dept: OBGYN | Facility: CLINIC | Age: 29
End: 2024-02-12
Attending: OBSTETRICS & GYNECOLOGY
Payer: COMMERCIAL

## 2024-02-12 ENCOUNTER — OFFICE VISIT (OUTPATIENT)
Dept: MATERNAL FETAL MEDICINE | Facility: CLINIC | Age: 29
End: 2024-02-12
Attending: OBSTETRICS & GYNECOLOGY
Payer: COMMERCIAL

## 2024-02-12 ENCOUNTER — TELEPHONE (OUTPATIENT)
Dept: OBGYN | Facility: CLINIC | Age: 29
End: 2024-02-12

## 2024-02-12 ENCOUNTER — LAB (OUTPATIENT)
Dept: LAB | Facility: CLINIC | Age: 29
End: 2024-02-12
Attending: OBSTETRICS & GYNECOLOGY
Payer: COMMERCIAL

## 2024-02-12 VITALS
HEIGHT: 62 IN | SYSTOLIC BLOOD PRESSURE: 126 MMHG | DIASTOLIC BLOOD PRESSURE: 78 MMHG | BODY MASS INDEX: 28.34 KG/M2 | WEIGHT: 154 LBS | HEART RATE: 71 BPM

## 2024-02-12 DIAGNOSIS — Z36.2 ENCOUNTER FOR FOLLOW-UP ULTRASOUND OF FETAL ANATOMY: ICD-10-CM

## 2024-02-12 DIAGNOSIS — Z36.89 ENCOUNTER FOR FETAL ANATOMIC SURVEY: Primary | ICD-10-CM

## 2024-02-12 DIAGNOSIS — O26.90 PREGNANCY RELATED CONDITION, ANTEPARTUM: ICD-10-CM

## 2024-02-12 DIAGNOSIS — R55 SYNCOPE, UNSPECIFIED SYNCOPE TYPE: Primary | ICD-10-CM

## 2024-02-12 DIAGNOSIS — R55 SYNCOPE, UNSPECIFIED SYNCOPE TYPE: ICD-10-CM

## 2024-02-12 LAB
ERYTHROCYTE [DISTWIDTH] IN BLOOD BY AUTOMATED COUNT: 12.8 % (ref 10–15)
HCT VFR BLD AUTO: 38 % (ref 35–47)
HGB BLD-MCNC: 12.5 G/DL (ref 11.7–15.7)
MCH RBC QN AUTO: 29.1 PG (ref 26.5–33)
MCHC RBC AUTO-ENTMCNC: 32.9 G/DL (ref 31.5–36.5)
MCV RBC AUTO: 88 FL (ref 78–100)
PLATELET # BLD AUTO: 272 10E3/UL (ref 150–450)
RBC # BLD AUTO: 4.3 10E6/UL (ref 3.8–5.2)
WBC # BLD AUTO: 7.4 10E3/UL (ref 4–11)

## 2024-02-12 PROCEDURE — 76805 OB US >/= 14 WKS SNGL FETUS: CPT | Mod: 26 | Performed by: OBSTETRICS & GYNECOLOGY

## 2024-02-12 PROCEDURE — 99213 OFFICE O/P EST LOW 20 MIN: CPT | Mod: 25 | Performed by: OBSTETRICS & GYNECOLOGY

## 2024-02-12 PROCEDURE — 36415 COLL VENOUS BLD VENIPUNCTURE: CPT

## 2024-02-12 PROCEDURE — 99207 PR PRENATAL VISIT: CPT | Performed by: OBSTETRICS & GYNECOLOGY

## 2024-02-12 PROCEDURE — 76805 OB US >/= 14 WKS SNGL FETUS: CPT

## 2024-02-12 PROCEDURE — 85048 AUTOMATED LEUKOCYTE COUNT: CPT

## 2024-02-12 NOTE — PROGRESS NOTES
"Please see \"Imaging\" tab under \"Chart Review\" for details of today's US at the [facility].    Jose Luis Milligan MD  Maternal-Fetal Medicine    "

## 2024-02-12 NOTE — TELEPHONE ENCOUNTER
----- Message from Cristina Burroughs MD sent at 2/12/2024  4:07 PM CST -----  Regarding: Please help pt get appt wiht  or Dr. Phillips   Needs appt with Bree or Jacqueline.    Cristina Burroughs MD, FACOG  (she/her/hers)    Department of Ob/Gyn/Women's Health  [employer] [facility]  [facility]  [address] [place] [address]. [place] [address]  [email or web address]  p. [phone]  f. [phone]

## 2024-02-12 NOTE — PROGRESS NOTES
[facility]  Return OB Visit  Irma is a 28 year old f at 18w1D by LMP presenting today for routine prenatal care. H/o panic attacks in past when very stressed.     S:  Denies vaginal bleeding, physiologic vaginal discharge of pregnancy, no contractions.  Reports good fetal movement. Denies headache, vision changes, RUQ pain, chest pain, SOB.   Describes twinges in right lower quadrant.     Has experienced one month of episodes of faintness characterized by dizziness but no imbalance, racing heart without increased pulse, and dyspnea, improving with presence of her  or salty food.     Pregnancy notable for  - Hep B nonimmunity    O: LMP 10/08/2023 (Exact Date)   Weight gain: 1.361 kg (3 lb)    Gen: Well-appearing, NAD  FHR: 150s    A/P:  Irma Weinberg is a 28 year old  at 18w1d by LMP, here for return OB visit.    Prenatal care  - A positive blood type,  rubella imm, not Hep B immune. Deferred vaccine for now.  - GBS to be performed at 36 weeks  - Appropriate weight gain      Genetic screening &  diagnosis  - Normal NT, NIPT low risk ,  - Normal US - rpt in 3 wks for cardiac anatomy not well seen.    Psychogenic syncopal episodes  -referral to psychotherapy placed  for panic attack management        -Oma Kang MS3  Return to clinic in  weeks. Discussed return precautions.    Staffed with Dr. Burroughs    The above patient was seen and evaluated with the medical student who acted as my scribe for the above note. Agree with note, changes made as appropriate.    Cristina Burroughs MD

## 2024-03-12 NOTE — PATIENT INSTRUCTIONS
"Weeks 14 to 18 of Your Pregnancy: Care Instructions  Around this time, you may start to look pregnant. Your baby is now able to pass urine. And the first stool (meconium) is starting to collect in your baby's intestines. Hair is starting to grow on your baby's head.    You may notice some skin changes, such as itchy spots on your palms or acne on your face.   At your next doctor visit, you may have an ultrasound. So you might think about whether you want to know the sex of your baby. Also ask your doctor about flu and COVID-19 shots.      How to reduce stress   Ask for help when you need it.  Try to avoid things that cause you stress.  Seek out things that relieve stress, such as breathing exercises or yoga.     How to get exercise   If you don't usually exercise, start slowly. Short walks may be a good choice.  Try to be active 30 minutes a day, at least 5 days a week.  Avoid activities where you're more likely to fall.  Use light weights to reduce stress on your joints.     How to stay at a healthy weight for you   Talk to your doctor or midwife about how much weight you should gain.  It's generally best to gain:  About 28 to 40 pounds if you're underweight.  About 25 to 35 pounds if you're at a healthy weight.  About 15 to 25 pounds if you're overweight.  About 11 to 20 pounds if you're very overweight (obese).  Follow-up care is a key part of your treatment and safety. Be sure to make and go to all appointments, and call your doctor if you are having problems. It's also a good idea to know your test results and keep a list of the medicines you take.  Where can you learn more?  Go to https://www.Citydeal.de.net/patiented  Enter I453 in the search box to learn more about \"Weeks 14 to 18 of Your Pregnancy: Care Instructions.\"  Current as of: July 11, 2023               Content Version: 13.8    4796-2647 [employer].   Care instructions adapted under license by your healthcare professional. If you have " questions about a medical condition or this instruction, always ask your healthcare professional. Healthwise, [facility] disclaims any warranty or liability for your use of this information.      Second-Trimester Fetal Ultrasound: About This Test  What is it?     Fetal ultrasound is a test that uses sound waves to make pictures of your baby (fetus) and placenta inside the uterus. The test is the safest way to find out the age, size, and position of your baby. You also may be able to find out the sex of your baby. (But the test isn't done just to find out a baby's sex.)  No known risks to the mother or the baby are linked to fetal ultrasound. But you may feel anxious if the test reveals a problem with your pregnancy or baby.  Why is this test done?  In the second trimester, a fetal ultrasound is done to:  Estimate the number of weeks and days a fetus has developed since the beginning of the pregnancy. This is called the gestational age.  Look at the size and position of the fetus, the placenta, and the fluid that surrounds the fetus.  Find major birth defects, such as heart problems or problems with the brain and spinal cord (neural tube defects). But the test may not be able to find many minor defects and some major birth defects.  How do you prepare for the test?  In general, there's nothing you have to do before this test, unless your doctor tells you to.  How is the test done?  You may be able to leave your clothes on, or you will be given a gown to wear.  You will lie on your back on a padded examination table.  A gel will be spread on your belly. It will be removed after the test.  A small, handheld device called a transducer will be pressed against the gel on your skin and moved across your belly several times.  You may watch the monitor to see the picture of your baby during the test.  What happens after the test?  You will probably be able to go home right away.  You most likely will be able to go back to  "your usual activities right away.  Follow-up care is a key part of your treatment and safety. Be sure to make and go to all appointments, and call your doctor if you are having problems. It's also a good idea to keep a list of the medicines you take. Ask your doctor when you can expect to have your test results.  Where can you learn more?  Go to https://www.Splother.FREECULTR/patiented  Enter Y671 in the search box to learn more about \"Second-Trimester Fetal Ultrasound: About This Test.\"  Current as of: July 11, 2023               Content Version: 13.8    6578-7347 [employer].   Care instructions adapted under license by your healthcare professional. If you have questions about a medical condition or this instruction, always ask your healthcare professional. [employer] disclaims any warranty or liability for your use of this information.      During Pregnancy: Exercises  Introduction  Here are some examples of exercises to do during your pregnancy. Start each exercise slowly. Ease off the exercise if you start to have pain.  Talk to your doctor about when you can start these exercises and which ones will work best for you.  How to do the exercises  Neck rotation    Sit in a firm chair, or stand tall. If you're standing, keep your feet about hip-width apart.  Keeping your chin level, turn your head to the right and hold for 15 to 30 seconds.  Turn your head to the left and hold for 15 to 30 seconds.  Repeat 2 to 4 times.  Next stretch to the front    Sit in a firm chair, or stand tall. Look straight ahead. If you're standing, keep your feet about hip-width apart.  Slowly bend your head forward without moving your shoulders.  Hold for 15 to 30 seconds, then return to your starting position.  Repeat 2 to 4 times.  Back press    Place your feet 10 to 12 inches from the wall.  Rest your back flat against the wall and slide down the wall until your knees are slightly bent.  Press your lower back " against the wall by pulling in your stomach muscles.  Hold for 6 seconds, and then relax your stomach muscles and slide back up the wall.  Repeat 8 to 12 times.  Trunk twist (seated)    Sit on the floor with your legs crossed. If that's not comfortable, you can sit on a folded blanket so your buttocks are a few inches off the floor. Or you can sit on a chair with your knees comfortably spread apart and your feet flat on the floor.  Reach your left hand toward your right knee. You can place your right hand at your side for support.  Slowly twist your body (trunk) to your right.  Relax and return to your starting position.  Repeat 2 to 4 times.  Switch your hands and twist to your left.  Repeat 2 to 4 times.  Pelvic rocking    Kneeling on hands and knees, place your hands directly under your shoulders and your knees under your hips.  Breathe in deeply. Tuck your head downward and round your back up, making a curve with your back in the shape of the letter C. Hold this position for a count of 6.  Breathe out slowly and bring your head back up. Relax, keeping your back straight (don't allow it to curve toward the floor). Hold this for a count of 6.  Do this exercise 8 times or to your comfort level.  Pelvic tilt    This exercise strengthens your lower back and pelvis. It is for use during the first 4 months of pregnancy. After this point, lying on your back is not recommended, because it can cause blood flow problems for you and your baby.  Lie on your back.  Keep your knees relaxed.  Tighten your belly and buttocks muscles.  At the same time, gently shift your pelvis upward. This should flatten the curve in your back.  Hold for 6 seconds and then relax.  Gradually increase the number of tilts you do each day, to your comfort level.  Backward stretch    Kneel on hands and knees with your knees 8 to 10 inches apart, hands directly under your shoulders, and arms and back straight.  Keeping your arms straight, slowly lower  your buttocks toward your heels and tuck your head toward your knees. Hold for 15 to 30 seconds.  Slowly return to the kneeling position.  Repeat 2 to 4 times.  Forward bend    Sit comfortably in a chair, with your arms relaxed.  Slowly bend forward, allowing your arms to hang down in front of you. Lean only as far as you can without feeling discomfort or pressure on your belly.  Hold for 15 to 30 seconds and then slowly sit up straight.  Repeat 2 to 4 times or to your comfort level.  Leg lift crawl    Kneeling on hands and knees, place your hands directly under your shoulders and straighten your arms.  Tighten your belly muscles by pulling in your belly button toward your spine. Be sure you continue to breathe normally and do not hold your breath.  Lift your left knee and bring it toward your elbow.  Slowly extend your leg behind you without completely straightening it. Be careful not to let your hip drop down. Avoid arching your back.  Hold your leg behind you for about 6 seconds.  Return to your starting position.  Do the same exercise with your other leg.  Repeat 8 to 12 times for each leg.  Tailor sitting    Sit on the floor.  Bring your feet close to your body while crossing your ankles.  Hold this position for as long as you are comfortable.  Tailor stretching    Sit on the floor with your back straight, legs about 12 inches apart, and feet relaxed outward.  Stretch your hands forward toward your left foot, then sit up.  Stretch your hands straight forward, then sit up.  Stretch your hands forward toward your right foot, then sit up.  Hold each stretch for 15 to 30 seconds.  Repeat 2 to 4 times.  Follow-up care is a key part of your treatment and safety. Be sure to make and go to all appointments, and call your doctor if you are having problems. It's also a good idea to know your test results and keep a list of the medicines you take.  Current as of: July 11, 2023               Content Version: 13.8     "[id number] Healthwise, [employer].   Care instructions adapted under license by your healthcare professional. If you have questions about a medical condition or this instruction, always ask your healthcare professional. [employer] disclaims any warranty or liability for your use of this information.      Weeks 18 to 22 of Your Pregnancy: Care Instructions  At this stage you may find that your nausea and fatigue are gone. You may feel better overall and have more energy. But you might now also have some new discomforts, like sleep problems or leg cramps.    You may start to feel your baby move. These movements can feel like butterflies or bubbles.   Babies at this stage can now suck their thumbs.     Get some exercise every day.  And avoid caffeine late in the day.     Take a warm shower or bath before bed.  Try relaxation exercises to calm your mind and body.     Use extra pillows.  They can help you get comfortable.     Don't use sleeping pills or alcohol.  They could harm your baby.     For leg cramps, stretch and apply heat.  A warm bath, leg warmers, a heating pad, or a hot water bottle can help with muscle aches.   Stretches for leg cramps    Straighten your leg and bend your foot (flex your ankle) slowly upward, toward your knee. Bend your toes up and down.   Stand on a flat surface. Stretch your toes upward. For balance, hold on to the wall or something stable. If it feels okay, take small steps walking on your heels.   Follow-up care is a key part of your treatment and safety. Be sure to make and go to all appointments, and call your doctor if you are having problems. It's also a good idea to know your test results and keep a list of the medicines you take.  Where can you learn more?  Go to https://www.[employer].net/patiented  Enter W603 in the search box to learn more about \"Weeks 18 to 22 of Your Pregnancy: Care Instructions.\"  Current as of: July 11, 2023               Content Version: " 13.8    0823-1165 [employer].   Care instructions adapted under license by your healthcare professional. If you have questions about a medical condition or this instruction, always ask your healthcare professional. [employer] disclaims any warranty or liability for your use of this information.      Weeks 22 to 26 of Your Pregnancy: Care Instructions  Your baby's lungs are getting ready for breathing. Your baby may respond to your voice. Your baby likely turns less, and kicks or jerks more. Jerking may mean that your baby has hiccups.    Think about taking childbirth classes. And start to think about whether you want to have pain medicine during labor.   At your next doctor visit, you may be tested for anemia and for high blood sugar that first occurs during pregnancy (gestational diabetes). These conditions can cause problems for you and your baby.     To ease discomfort, such as back pain    Change your position often. Try not to sit or stand for too long.  Get some exercise. Things like walking or stretching may help.  Try using a heating pad or cold pack.    To ease or reduce swelling in your feet, ankles, hands, and fingers    Take off your rings.  Avoid high-sodium foods, such as potato chips.  Prop up your feet, and sleep with pillows under your feet.  Try to avoid standing for long periods of time.  Do not wear tight shoes.  Wear support stockings.  Kegel exercises to prevent urine from leaking    Squeeze your muscles as if you were trying not to pass gas. Your belly, legs, and buttocks shouldn't move. Hold the squeeze for 3 seconds, then relax for 5 to 10 seconds.    Add 1 second each week until you can squeeze for 10 seconds. Repeat the exercise 10 times a session. Do 3 to 8 sessions a day. If these exercises cause you pain, stop doing them and talk with your doctor.  Follow-up care is a key part of your treatment and safety. Be sure to make and go to all appointments, and call  "your doctor if you are having problems. It's also a good idea to know your test results and keep a list of the medicines you take.  Where can you learn more?  Go to https://www.Health As We Age.net/patiented  Enter G264 in the search box to learn more about \"Weeks 22 to 26 of Your Pregnancy: Care Instructions.\"  Current as of: July 11, 2023               Content Version: 13.8    3752-5876 [employer].   Care instructions adapted under license by your healthcare professional. If you have questions about a medical condition or this instruction, always ask your healthcare professional. [employer] disclaims any warranty or liability for your use of this information.      Round Ligament Pain: Care Instructions  Your Care Instructions     Round ligament pain is a common pain during pregnancy. You may feel a sharp brief pain on one or both sides of your belly. It may go down into your groin. It's usually felt for the first time during the second trimester.  This pain is a normal part of pregnancy. It will go away as your pregnancy continues or after your baby is born.  Your uterus is supported by two ligaments that go from the top and sides of the uterus to the bones of the pelvis. These are the round ligaments. As your uterus grows, these ligaments stretch and tighten with your movements. This may be the cause of the pain. You may find that certain activities seem to cause pain. If you can, avoid those activities.  Your doctor can usually diagnose round ligament pain from your symptoms and an exam. If you have bleeding or other symptoms, your doctor may also do an imaging test, such as an ultrasound. Your doctor may suggest that you take an over-the-counter pain medicine, such as acetaminophen.  Follow-up care is a key part of your treatment and safety. Be sure to make and go to all appointments, and call your doctor if you are having problems. It's also a good idea to know your test results and keep " "a list of the medicines you take.  How can you care for yourself at home?  If certain movements seem to trigger belly pain, see if you can avoid them while you are pregnant.  Stay active. If your doctor says it's okay, try moderate exercise. Water exercise may be a good choice if you have belly pain. Examples are swimming and water aerobics.  Ask your doctor about taking acetaminophen for pain. Be safe with medicines. Read and follow all instructions on the label.  When should you call for help?   Call your doctor now or seek immediate medical care if:    You think you might be in labor.     You have new or worse pain.   Watch closely for changes in your health, and be sure to contact your doctor if you have any problems.  Where can you learn more?  Go to https://www.WHObyYOU.net/patiented  Enter R110 in the search box to learn more about \"Round Ligament Pain: Care Instructions.\"  Current as of: July 11, 2023               Content Version: 13.8    8619-8841 [employer].   Care instructions adapted under license by your healthcare professional. If you have questions about a medical condition or this instruction, always ask your healthcare professional. [employer] disclaims any warranty or liability for your use of this information.      Leg and Ankle Edema: Care Instructions  Your Care Instructions  Swelling in the legs, ankles, and feet is called edema. It is common after you sit or stand for a while. Long plane flights or car rides often cause swelling in the legs and feet. You may also have swelling if you have to stand for long periods of time at your job. Problems with the veins in the legs (varicose veins) and changes in hormones can also cause swelling. Sometimes the swelling in the ankles and feet is caused by a more serious problem, such as heart failure, infection, blood clots, or liver or kidney disease.  Follow-up care is a key part of your treatment and safety. Be sure to " "make and go to all appointments, and call your doctor if you are having problems. It's also a good idea to know your test results and keep a list of the medicines you take.  How can you care for yourself at home?  If your doctor gave you medicine, take it as prescribed. Call your doctor if you think you are having a problem with your medicine.  Whenever you are resting, raise your legs up. Try to keep the swollen area higher than the level of your heart.  Take breaks from standing or sitting in one position.  Walk around to increase the blood flow in your lower legs.  Move your feet and ankles often while you stand, or tighten and relax your leg muscles.  Wear support stockings. Put them on in the morning, before swelling gets worse.  Eat a balanced diet. Lose weight if you need to.  Limit the amount of salt (sodium) in your diet. Salt holds fluid in the body and may increase swelling.  When should you call for help?   Call 911 anytime you think you may need emergency care. For example, call if:    You have symptoms of a blood clot in your lung (called a pulmonary embolism). These may include:  Sudden chest pain.  Trouble breathing.  Coughing up blood.   Call your doctor now or seek immediate medical care if:    You have signs of a blood clot, such as:  Pain in your calf, back of the knee, thigh, or groin.  Redness and swelling in your leg or groin.     You have symptoms of infection, such as:  Increased pain, swelling, warmth, or redness.  Red streaks or pus.  A fever.   Watch closely for changes in your health, and be sure to contact your doctor if:    Your swelling is getting worse.     You have new or worsening pain in your legs.     You do not get better as expected.   Where can you learn more?  Go to https://www.healthwise.net/patiented  Enter N696 in the search box to learn more about \"Leg and Ankle Edema: Care Instructions.\"  Current as of: November 13, 2022               Content Version: 13.8    4467-2867 " [employer].   Care instructions adapted under license by your healthcare professional. If you have questions about a medical condition or this instruction, always ask your healthcare professional. [employer] disclaims any warranty or liability for your use of this information.      Back Pain During Pregnancy: Care Instructions  Overview     Back pain has many possible causes. It is often caused by problems with muscles and ligaments in your back. The extra weight during pregnancy can put stress on your back. Moving, lifting, standing, sitting, or sleeping in an awkward way also can strain your back. Back pain can also be a sign of labor. Although it may hurt a lot, back pain often improves on its own. Use good home treatment, and take care not to stress your back.  Follow-up care is a key part of your treatment and safety. Be sure to make and go to all appointments, and call your doctor if you are having problems. It's also a good idea to know your test results and keep a list of the medicines you take.  How can you care for yourself at home?  Ask your doctor about taking acetaminophen (Tylenol) for pain. Do not take aspirin, ibuprofen (Advil, Motrin), or naproxen (Aleve).  Do not take two or more pain medicines at the same time unless the doctor told you to. Many pain medicines have acetaminophen, which is Tylenol. Too much acetaminophen (Tylenol) can be harmful.  Lie on your side with your knees and hips bent and a pillow between your legs. This reduces stress on your back.  Put ice or cold packs on your back for 10 to 20 minutes at a time, several times a day. Put a thin cloth between the ice and your skin.  Warm baths may also help reduce pain.  Change positions every 30 minutes. Take breaks if you must sit for a long time. Get up and walk around.  Ask your doctor about how much exercise you can do. You may feel better taking short walks or doing gentle movements and stretching in a  "swimming pool.  Ask your doctor about exercises to stretch and strengthen your back.  When should you call for help?   Call your doctor now or seek immediate medical care if:    You think you are in labor.     You have new numbness in your buttocks, genital or rectal areas, or legs.     You have a new loss of bowel or bladder control.   Watch closely for changes in your health, and be sure to contact your doctor if:    You do not get better as expected.   Where can you learn more?  Go to https://www.Rocket.La.net/patiented  Enter C696 in the search box to learn more about \"Back Pain During Pregnancy: Care Instructions.\"  Current as of: 2023               Content Version: 13.8    3817-2941 [employer]   Care instructions adapted under license by your healthcare professional. If you have questions about a medical condition or this instruction, always ask your healthcare professional. [employer] disclaims any warranty or liability for your use of this information.       Labor: Care Instructions  Overview      labor is the start of labor between 20 and 36 weeks of pregnancy. Most babies are born at 37 to 42 weeks of pregnancy. In labor, the uterus contracts to open the cervix. This is the first stage of childbirth.  labor can be caused by a problem with the baby, the mother, or both. Often the cause is not known.  In some cases, doctors use medicines to try to delay labor until 34 or more weeks of pregnancy. By this time, a baby has grown enough so that problems are not likely. In some cases--such as with a serious infection--it is healthier for the baby to be born early. Your treatment will depend on how far along you are in your pregnancy and on your health and your baby's health.  Follow-up care is a key part of your treatment and safety. Be sure to make and go to all appointments, and call your doctor if you are having problems. It's also a good idea to know " your test results and keep a list of the medicines you take.  How can you care for yourself at home?  If your doctor prescribed medicines, take them exactly as directed. Call your doctor if you think you are having a problem with your medicine.  Rest until your doctor advises you about activity.  Do not have sexual intercourse unless your doctor says it is safe.  Use sanitary pads if you have vaginal bleeding. Using pads makes it easier to monitor your bleeding.  Do not smoke or allow others to smoke around you. If you need help quitting, talk to your doctor about stop-smoking programs and medicines. These can increase your chances of quitting for good.  When should you call for help?   Call 911  anytime you think you may need emergency care. For example, call if:    You passed out (lost consciousness).     You have a seizure.     You have severe vaginal bleeding.     You have severe pain in your belly or pelvis that doesn't get better between contractions.     You have had fluid gushing or leaking from your vagina and you know or think the umbilical cord is bulging into your vagina. If this happens, immediately get down on your knees so your rear end (buttocks) is higher than your head. This will decrease the pressure on the cord until help arrives.   Call your doctor now or seek immediate medical care if:    You have signs of preeclampsia, such as:  Sudden swelling of your face, hands, or feet.  New vision problems (such as dimness, blurring, or seeing spots).  A severe headache.     You have any vaginal bleeding.     You have belly pain or cramping.     You have a fever.     You have had regular contractions (with or without pain) for an hour. This means that you have 6 or more within 1 hour after you change your position and drink fluids.     You have a sudden release of fluid from the vagina.     You have low back pain or pelvic pressure that does not go away.     You notice that your baby has stopped moving or  "is moving much less than normal.   Watch closely for changes in your health, and be sure to contact your doctor if you have any problems.  Where can you learn more?  Go to [email or web address]/[email or web address]  Enter Q400 in the search box to learn more about \" Labor: Care Instructions.\"  Current as of: 2023               Content Version: 13.8    3791-8956 [employer].   Care instructions adapted under license by your healthcare professional. If you have questions about a medical condition or this instruction, always ask your healthcare professional. [employer] disclaims any warranty or liability for your use of this information.      Weeks 18 to 22 of Your Pregnancy: Care Instructions  At this stage you may find that your nausea and fatigue are gone. You may feel better overall and have more energy. But you might now also have some new discomforts, like sleep problems or leg cramps.    You may start to feel your baby move. These movements can feel like butterflies or bubbles.   Babies at this stage can now suck their thumbs.     Get some exercise every day.  And avoid caffeine late in the day.     Take a warm shower or bath before bed.  Try relaxation exercises to calm your mind and body.     Use extra pillows.  They can help you get comfortable.     Don't use sleeping pills or alcohol.  They could harm your baby.     For leg cramps, stretch and apply heat.  A warm bath, leg warmers, a heating pad, or a hot water bottle can help with muscle aches.   Stretches for leg cramps    Straighten your leg and bend your foot (flex your ankle) slowly upward, toward your knee. Bend your toes up and down.   Stand on a flat surface. Stretch your toes upward. For balance, hold on to the wall or something stable. If it feels okay, take small steps walking on your heels.   Follow-up care is a key part of your treatment and safety. Be sure to make and go to all appointments, and call your " "doctor if you are having problems. It's also a good idea to know your test results and keep a list of the medicines you take.  Where can you learn more?  Go to https://www.[email or web address].net/patiented  Enter W603 in the search box to learn more about \"Weeks 18 to 22 of Your Pregnancy: Care Instructions.\"  Current as of: July 11, 2023               Content Version: 13.8    1475-9485 [employer].   Care instructions adapted under license by your healthcare professional. If you have questions about a medical condition or this instruction, always ask your healthcare professional. [employer] disclaims any warranty or liability for your use of this information.      "

## 2024-03-13 ENCOUNTER — PRENATAL OFFICE VISIT (OUTPATIENT)
Dept: OBGYN | Facility: CLINIC | Age: 29
End: 2024-03-13
Attending: OBSTETRICS & GYNECOLOGY
Payer: COMMERCIAL

## 2024-03-13 ENCOUNTER — LAB (OUTPATIENT)
Dept: LAB | Facility: CLINIC | Age: 29
End: 2024-03-13
Attending: OBSTETRICS & GYNECOLOGY
Payer: COMMERCIAL

## 2024-03-13 VITALS
DIASTOLIC BLOOD PRESSURE: 70 MMHG | WEIGHT: 160 LBS | HEART RATE: 80 BPM | SYSTOLIC BLOOD PRESSURE: 115 MMHG | HEIGHT: 62 IN | BODY MASS INDEX: 29.44 KG/M2

## 2024-03-13 DIAGNOSIS — O26.812 PREGNANCY RELATED FATIGUE IN SECOND TRIMESTER: Primary | ICD-10-CM

## 2024-03-13 DIAGNOSIS — O26.812 PREGNANCY RELATED FATIGUE IN SECOND TRIMESTER: ICD-10-CM

## 2024-03-13 LAB — TSH SERPL DL<=0.005 MIU/L-ACNC: 3.53 UIU/ML (ref 0.3–4.2)

## 2024-03-13 PROCEDURE — 84443 ASSAY THYROID STIM HORMONE: CPT

## 2024-03-13 PROCEDURE — 36415 COLL VENOUS BLD VENIPUNCTURE: CPT

## 2024-03-13 PROCEDURE — 99211 OFF/OP EST MAY X REQ PHY/QHP: CPT | Performed by: OBSTETRICS & GYNECOLOGY

## 2024-03-13 PROCEDURE — 99207 PR PRENATAL VISIT: CPT | Mod: GC | Performed by: OBSTETRICS & GYNECOLOGY

## 2024-03-13 NOTE — NURSING NOTE
Chief Complaint   Patient presents with    Prenatal Care     CHUCK 22 weeks and 6 days    Jessica Juarez LPN

## 2024-03-13 NOTE — PROGRESS NOTES
"[facility]  Return OB Visit    Irma is a 28 year old  at 22w3d here for a return ob visit.    S:  She has been having significant reflux that makes it difficult to sleep. She has one large meal a day at lunch time and afterwards she experiences some chest pain and shortness of breath that resolves after an hour. She is also continuing to have episodes of lightheadedness that seem to be related to fatigue. She does not experience these symptoms when she is resting or not very physically active.    Denies VB and contractions.  Reports fetal movement. Denies headache, vision changes, RUQ pain, chest pain, SOB.    Pregnancy notable for  - Hep B nonimmunity    O: /70   Pulse 80   Ht 1.58 m (5' 2.2\")   Wt 72.6 kg (160 lb)   LMP 10/08/2023 (Exact Date)   BMI 29.08 kg/m    Weight gain: 5.443 kg (12 lb)    Gen: Well-appearing, NAD  FHR: 160-165  Fundal height: 2cm above umbilicus    A/P:  Irma Weinberg is a 28 year old  at 22w3d by LMP, here for return OB visit.    Prenatal care  - Rh pos,  rubella imm, Hep B NI. Deferred vaccine for now.  - GBS to be performed at 36 weeks  - Appropriate weight gain    Genetic screening &  diagnosis  - Normal NT, NIPT low risk   - Normal US - rpt in 3 wks for cardiac anatomy not well seen.    GERD  - Has only been minimally responsive to tums  - Recommended frequent, light meals as opposed to a single large meal  - Recommended daily pepcid    Lightheadedness  - Possible component of inadequate nutrition and hydration. It is difficult to eat with the amount of GERD she is having. Discussed maintaining adequate nutrition and hydrating with electrolyte containing beverages  - CBC wnl at last visit  - Ordered nutrition consult today  - TSH ordered today    RTC in 4 weeks    Staffed with Dr. Pepito East, PGY-1    I have seen and examined the patient with the resident. I have reviewed, edited, and agree with the note.     Emily Beyer, " MD

## 2024-03-14 ENCOUNTER — HOSPITAL ENCOUNTER (OUTPATIENT)
Dept: ULTRASOUND IMAGING | Facility: CLINIC | Age: 29
Discharge: HOME | End: 2024-03-14
Attending: OBSTETRICS & GYNECOLOGY
Payer: COMMERCIAL

## 2024-03-14 ENCOUNTER — OFFICE VISIT (OUTPATIENT)
Dept: MATERNAL FETAL MEDICINE | Facility: CLINIC | Age: 29
End: 2024-03-14
Attending: OBSTETRICS & GYNECOLOGY
Payer: COMMERCIAL

## 2024-03-14 DIAGNOSIS — O35.EXX0 ENCOUNTER FOR REPEAT ULTRASOUND OF FETAL PYELECTASIS, ANTEPARTUM, SINGLE OR UNSPECIFIED FETUS: Primary | ICD-10-CM

## 2024-03-14 DIAGNOSIS — Z36.2 ENCOUNTER FOR FOLLOW-UP ULTRASOUND OF FETAL ANATOMY: ICD-10-CM

## 2024-03-14 PROCEDURE — 76816 OB US FOLLOW-UP PER FETUS: CPT

## 2024-03-14 PROCEDURE — 76816 OB US FOLLOW-UP PER FETUS: CPT | Mod: 26 | Performed by: STUDENT IN AN ORGANIZED HEALTH CARE EDUCATION/TRAINING PROGRAM

## 2024-03-14 PROCEDURE — 99213 OFFICE O/P EST LOW 20 MIN: CPT | Mod: 25 | Performed by: STUDENT IN AN ORGANIZED HEALTH CARE EDUCATION/TRAINING PROGRAM

## 2024-03-14 NOTE — PROGRESS NOTES
"Please see \"Imaging\" tab under \"Chart Review\" for details of today's visit.    Serenity Marquez    "

## 2024-04-08 ENCOUNTER — TELEPHONE (OUTPATIENT)
Dept: OBGYN | Facility: CLINIC | Age: 29
End: 2024-04-08
Payer: COMMERCIAL

## 2024-04-08 DIAGNOSIS — R12 HEARTBURN: Primary | ICD-10-CM

## 2024-04-08 NOTE — TELEPHONE ENCOUNTER
[facility] Call Center    Phone Message    May a detailed message be left on voicemail: yes     Reason for Call: Other: Patient is calling to see if it is okay that the baby is kicking a lot the past 24 hrs and very active. He was kicking all through the  night. She is wondering if it is normal or if something is annoying him. Please call the patient back to discuss. Thank you.      Action Taken: Other: obgyn    Travel Screening: Not Applicable

## 2024-04-09 NOTE — PATIENT INSTRUCTIONS
Thank you for trusting us with your care!     If you need to contact us for questions about:  Symptoms, Scheduling & Medical Questions; Non-urgent (2-3 day response) [place] message, Urgent (needing response today) [phone] (if after 3:30pm next day response)   Prescriptions: Please call your Pharmacy   Billing: Kari [phone] or [facility]:[phone]    Learning About Screening for Gestational Diabetes  What is gestational diabetes screening?     Screening for gestational diabetes is a way to look for high blood sugar during pregnancy. You drink some very sweet liquid. Then you have a blood test to see how your body uses sugar (glucose).  How is gestational diabetes screening done?  Screening for gestational diabetes may be done in a couple of ways.  Two-part screening.  Part one (glucose challenge test): A blood sample is taken after you drink a liquid that contains sugar (glucose). You don't need to stop eating or drinking before this test. If the test shows that you don't have a lot of sugar in your blood, you don't have gestational diabetes.  Part two (oral glucose tolerance test, or OGTT): If the first test shows a lot of sugar in your blood, then you may have an OGTT. You can't eat or drink for at least 8 hours before this test. A blood sample is taken, then you drink a sweet liquid. You have more blood tests after 1 to 3 hours. If the OGTT shows that you have a lot of sugar in your blood, you may have gestational diabetes.  One-part screening.  Sometimes doctors use the OGTT on its own. If the test shows that you don't have a lot of sugar in your blood, you don't have gestational diabetes. If you do have a lot of sugar in your blood, you may have the condition.  What are the risks of screening?  Your blood glucose level may drop very low toward the end of the test. If this happens, you may feel weak, hungry, and restless. Tell your doctor if you have these symptoms. The test usually will be  "stopped.  You may vomit after drinking the sweet liquid. If this happens, you may need to take the test at a later time.  Your doctor may do more glucose tests at other times during your pregnancy.  Follow-up care is a key part of your treatment and safety. Be sure to make and go to all appointments, and call your doctor if you are having problems. It's also a good idea to know your test results and keep a list of the medicines you take.  Where can you learn more?  Go to https://[email or web address].net/patiented  Enter A472 in the search box to learn more about \"Learning About Screening for Gestational Diabetes.\"  Current as of: 2023               Content Version: 14.0    5420-2957 [employer].   Care instructions adapted under license by your healthcare professional. If you have questions about a medical condition or this instruction, always ask your healthcare professional. [employer] disclaims any warranty or liability for your use of this information.      You have been provided the My Labor and Birth Wishes document.  Please review at home and bring to your next prenatal visit. Bring this sheet to the hospital for your birth. Give copies to your care team members and support person.   Additional copies can be found here:  [email or web address]  Weeks 26 to 30 of Your Pregnancy: Care Instructions  You're starting your last trimester. You'll probably feel your baby moving around more. Your back may ache as your body gets used to your baby's size and length. Take care of yourself, and pay attention to what your body needs.    Talk to your doctor about getting the Tdap shot. It will help protect your  against whooping cough (pertussis). Also ask your doctor about flu and COVID-19 shots if you haven't had them yet. If your blood type is Rh negative, you may be given a shot of Rh immune globulin (such as RhoGAM). It can help prevent problems for your baby.    You may have " "Dane-Rios contractions. They are single or several strong contractions without a pattern. These are practice contractions but not the start of labor.  Be kind to yourself.     Take breaks when you're tired.  Change positions often. Don't sit for too long or stand for too long.  At work, rest during breaks if you can. If you don't get breaks, talk to your doctor about writing a letter to your employer to request them.  Avoid fumes, chemicals, and tobacco smoke.  Be sexual if you want to.     You may be interested in sex, or you may not. Everyone is different.  Sex is okay unless your doctor tells you not to.  Your belly can make it hard to find good positions for sex. Seth Ward and explore.  Watch for signs of  labor.    These signs include:   Menstrual-like cramps. Or you may have pain or pressure in your pelvis that happens in a pattern.  About 6 or more contractions in an hour (even after rest and a glass of water).  A low, dull backache that doesn't go away when you change positions.  An increase or change in vaginal discharge.  Light vaginal bleeding or spotting.  Your water breaking.  Know what to do if you think you are having contractions.     Drink 1 or 2 glasses of water.  Lie down on your left side for at least an hour.  While on your side, feel the top of your belly to see if it's tight.  Write down your contractions for an hour. Time how long it is from the start of one contraction to the start of the next.  Call your doctor if you have regular contractions.  Follow-up care is a key part of your treatment and safety. Be sure to make and go to all appointments, and call your doctor if you are having problems. It's also a good idea to know your test results and keep a list of the medicines you take.  Where can you learn more?  Go to https://www.healthwise.net/patiented  Enter S999 in the search box to learn more about \"Weeks 26 to 30 of Your Pregnancy: Care Instructions.\"  Current as of: July " 10, 2023               Content Version: 14.0    7079-7693 [employer].   Care instructions adapted under license by your healthcare professional. If you have questions about a medical condition or this instruction, always ask your healthcare professional. [employer] disclaims any warranty or liability for your use of this information.      Counting Your Baby's Kicks: Care Instructions  Overview     Counting your baby's kicks is one way your doctor can tell that your baby is healthy. You will probably feel your baby move for the first time between 16 and 22 weeks. The movement may feel like flutters rather than kicks. Your baby may move more at certain times of the day. When you are active, you may notice less kicking than when you are resting. At your prenatal visits, your doctor will ask whether the baby is active.  In your last trimester, your doctor may ask you to count the number of times you feel your baby move.  Follow-up care is a key part of your treatment and safety. Be sure to make and go to all appointments, and call your doctor if you are having problems. It's also a good idea to know your test results and keep a list of the medicines you take.  How do you count fetal kicks?  A common method of checking your baby's movement is to note the length of time it takes to count 10 movements (such as kicks, flutters, or rolls).  Pick your baby's most active time of day to count. This may be any time from morning to evening.  If you don't feel 10 movements in an hour, have something to eat or drink and count for another hour. If you don't feel at least 10 movements in the 2-hour period, call your doctor.  Do not use an at-home Doppler heart monitor in place of counting fetal movements.  When should you call for help?   Call your doctor now or seek immediate medical care if:    You feel fewer than 10 movements in a 2-hour period.     You noticed that your baby has stopped moving or is  "moving less than normal.   Watch closely for changes in your health, and be sure to contact your doctor if you have any problems.  Where can you learn more?  Go to [email or web address]  Enter U048 in the search box to learn more about \"Counting Your Baby's Kicks: Care Instructions.\"  Current as of: July 10, 2023               Content Version: 14.0    5663-9137 [employer].   Care instructions adapted under license by your healthcare professional. If you have questions about a medical condition or this instruction, always ask your healthcare professional. [employer] disclaims any warranty or liability for your use of this information.        "

## 2024-04-09 NOTE — TELEPHONE ENCOUNTER
Returned call to patient re: message below. Patient is a  at 26+2. Patient states she was sleeping the night before and noticed her baby was very active overnight. Reviewed normal sleep/wake cycles based on gestational age. Reviewed that baby may be awake/active while patient is sleeping and that this is normal.     Patient also had questions about increased heartburn. Patient is currently taking 20 mg Pepcid and Tums to manage heartburn, but is noticing an increase in symptoms. Offered Prilosec Rx and patient is agreeable.

## 2024-04-10 ENCOUNTER — PRENATAL OFFICE VISIT (OUTPATIENT)
Dept: OBGYN | Facility: CLINIC | Age: 29
End: 2024-04-10
Attending: MIDWIFE
Payer: COMMERCIAL

## 2024-04-10 ENCOUNTER — APPOINTMENT (OUTPATIENT)
Dept: LAB | Facility: CLINIC | Age: 29
End: 2024-04-10
Attending: MIDWIFE
Payer: COMMERCIAL

## 2024-04-10 VITALS
BODY MASS INDEX: 30.82 KG/M2 | HEART RATE: 87 BPM | HEIGHT: 62 IN | SYSTOLIC BLOOD PRESSURE: 114 MMHG | WEIGHT: 167.5 LBS | DIASTOLIC BLOOD PRESSURE: 75 MMHG

## 2024-04-10 DIAGNOSIS — Z34.90 NORMAL INTRAUTERINE PREGNANCY, ANTEPARTUM: Primary | ICD-10-CM

## 2024-04-10 DIAGNOSIS — D50.9 IRON DEFICIENCY ANEMIA, UNSPECIFIED IRON DEFICIENCY ANEMIA TYPE: Primary | ICD-10-CM

## 2024-04-10 LAB
ERYTHROCYTE [DISTWIDTH] IN BLOOD BY AUTOMATED COUNT: 12.9 % (ref 10–15)
GLUCOSE 1H P 50 G GLC PO SERPL-MCNC: 93 MG/DL (ref 70–129)
HCT VFR BLD AUTO: 32.5 % (ref 35–47)
HGB BLD-MCNC: 10.7 G/DL (ref 11.7–15.7)
MCH RBC QN AUTO: 29.2 PG (ref 26.5–33)
MCHC RBC AUTO-ENTMCNC: 32.9 G/DL (ref 31.5–36.5)
MCV RBC AUTO: 89 FL (ref 78–100)
PLATELET # BLD AUTO: 283 10E3/UL (ref 150–450)
RBC # BLD AUTO: 3.66 10E6/UL (ref 3.8–5.2)
T PALLIDUM AB SER QL: NONREACTIVE
VIT D+METAB SERPL-MCNC: 44 NG/ML (ref 20–50)
WBC # BLD AUTO: 7.6 10E3/UL (ref 4–11)

## 2024-04-10 PROCEDURE — 99213 OFFICE O/P EST LOW 20 MIN: CPT | Performed by: MIDWIFE

## 2024-04-10 PROCEDURE — 82306 VITAMIN D 25 HYDROXY: CPT | Performed by: MIDWIFE

## 2024-04-10 PROCEDURE — 82950 GLUCOSE TEST: CPT | Performed by: MIDWIFE

## 2024-04-10 PROCEDURE — 36415 COLL VENOUS BLD VENIPUNCTURE: CPT | Performed by: MIDWIFE

## 2024-04-10 PROCEDURE — 86780 TREPONEMA PALLIDUM: CPT | Performed by: MIDWIFE

## 2024-04-10 PROCEDURE — 99207 PR PRENATAL VISIT: CPT | Performed by: MIDWIFE

## 2024-04-10 PROCEDURE — 85041 AUTOMATED RBC COUNT: CPT | Performed by: MIDWIFE

## 2024-04-10 RX ORDER — MULTIVIT WITH MINERALS/LUTEIN
250 TABLET ORAL DAILY
Qty: 90 TABLET | Refills: 2 | Status: SHIPPED | OUTPATIENT
Start: 2024-04-10 | End: 2024-08-08

## 2024-04-10 RX ORDER — LANOLIN ALCOHOL/MO/W.PET/CERES
1000 CREAM (GRAM) TOPICAL DAILY
Qty: 90 TABLET | Refills: 2 | Status: SHIPPED | OUTPATIENT
Start: 2024-04-10 | End: 2024-06-05

## 2024-04-10 RX ORDER — FERROUS SULFATE 325(65) MG
325 TABLET ORAL
Qty: 90 TABLET | Refills: 2 | Status: SHIPPED | OUTPATIENT
Start: 2024-04-10 | End: 2024-08-08

## 2024-04-10 NOTE — PROGRESS NOTES
"28 year old  at 26w3d presentst for a routine prenatal appointment.     no vaginal bleeding or leakage of fluid.  no contractions or cramping.    pos fetal movement.       No HA, visual changes, RUQ or epigastric pain.  Concerns: c/o heartburn  using tums 1-2 a day  takes pepcid in evening some days    Reviewed she has new med omeprazole prescribed  20-40 mg ok to use daily if persistent heartburn which interfers with sleep         Objective:  Vitals:    04/10/24 1058   BP: 114/75   Pulse: 87   Weight: 76 kg (167 lb 8 oz)   Height: 1.58 m (5' 2.2\")     See OB flowsheet  Blood type: A POS     EOB folder given reviewed PTL, when to call CB ed, warning signs,   [facility] choice: MD group   Partner's name: Vic  [x]NOB folder  [x]Dating  [x] 1st trimester screening: MFM referral placed for 1st trimester screening place  [x]Offer AFP after 15 wks  [x]Fetal anatomy US ordered  [x]Rubella immune  [x]Hep B nonimmune-considering immunization  [x]Varicella immune  [x]Pap at [facility] signed  [x] No added risk for PRE-E  [x] GDM risk, recommended early GCT and hgb A1C  [x]No need for utox in labor  [x]COVID vaccine completed  _____________________________________  [x]EOB folder  [x]PP Contraception plan: condoms   [x]Labor plans:spouse and maybe mom if she gets visa from [place]   []:  [x]Infant feeding plan Breast   [x]FLU shot  []TDAP given    needs   [x]RSV  NA  [x]Rhogam if needed, date:  NA   []TOLAC consent done  NA  [] Water birth interest  []GCT, passed    A/P:  Patient Active Problem List   Diagnosis    Nonimmune to hepatitis B virus    Normal intrauterine pregnancy, antepartum        Updated individualized prenatal care plan on the problem list for 24-28weeks  Continue scheduled prenatal care  ALFREDO LangstonM             "

## 2024-04-15 ENCOUNTER — TELEPHONE (OUTPATIENT)
Dept: OBGYN | Facility: CLINIC | Age: 29
End: 2024-04-15
Payer: COMMERCIAL

## 2024-04-15 NOTE — TELEPHONE ENCOUNTER
Caller reporting the following red-flag symptom(s): yellow discharge; 27 weeks 1 day GA    Per the system red-flag symptom policy, patient was instructed to:  speak with a Registered Nurse    Action:  Warm transferred to Registered Nurse

## 2024-04-15 NOTE — TELEPHONE ENCOUNTER
Received call from patient via red-flag line for concerns of vaginal discharge at 27+1. Patient is a  who stated she went for a walk yesterday and noticed yellow vaginal discharge after. Patient denies an abnormal smell, itching or pain. Patient is unable to answer if this has happened before. Patient stated her partner and herself had intercourse the day before. Informed patient that this x1 episode of discharge could be related to recent intercourse - patient verbalized understanding.     Encouraged patient to call clinic if she develops foul smelling discharge, itching or pain. Patient agreeable.

## 2024-04-29 ENCOUNTER — HOSPITAL ENCOUNTER (OUTPATIENT)
Facility: CLINIC | Age: 29
Discharge: HOME | End: 2024-04-29
Attending: OBSTETRICS & GYNECOLOGY | Admitting: STUDENT IN AN ORGANIZED HEALTH CARE EDUCATION/TRAINING PROGRAM
Payer: COMMERCIAL

## 2024-04-29 ENCOUNTER — NURSE TRIAGE (OUTPATIENT)
Dept: OBGYN | Facility: CLINIC | Age: 29
End: 2024-04-29
Payer: COMMERCIAL

## 2024-04-29 ENCOUNTER — HOSPITAL ENCOUNTER (OUTPATIENT)
Facility: CLINIC | Age: 29
End: 2024-04-29
Admitting: STUDENT IN AN ORGANIZED HEALTH CARE EDUCATION/TRAINING PROGRAM
Payer: COMMERCIAL

## 2024-04-29 VITALS
HEIGHT: 62 IN | WEIGHT: 167.55 LBS | SYSTOLIC BLOOD PRESSURE: 117 MMHG | RESPIRATION RATE: 18 BRPM | BODY MASS INDEX: 30.83 KG/M2 | DIASTOLIC BLOOD PRESSURE: 71 MMHG

## 2024-04-29 PROBLEM — Z36.89 ENCOUNTER FOR TRIAGE IN PREGNANT PATIENT: Status: ACTIVE | Noted: 2024-04-29

## 2024-04-29 LAB
ALBUMIN UR-MCNC: 10 MG/DL
APPEARANCE UR: CLEAR
BACTERIA #/AREA URNS HPF: ABNORMAL /HPF
BILIRUB UR QL STRIP: NEGATIVE
COLOR UR AUTO: ABNORMAL
GLUCOSE UR STRIP-MCNC: NEGATIVE MG/DL
HGB UR QL STRIP: NEGATIVE
KETONES UR STRIP-MCNC: NEGATIVE MG/DL
LEUKOCYTE ESTERASE UR QL STRIP: NEGATIVE
MUCOUS THREADS #/AREA URNS LPF: PRESENT /LPF
NITRATE UR QL: NEGATIVE
PH UR STRIP: 6 [PH] (ref 5–7)
RBC URINE: 2 /HPF
SP GR UR STRIP: 1.02 (ref 1–1.03)
SQUAMOUS EPITHELIAL: 1 /HPF
TRANSITIONAL EPI: <1 /HPF
UROBILINOGEN UR STRIP-MCNC: NORMAL MG/DL
WBC URINE: 3 /HPF

## 2024-04-29 PROCEDURE — 99214 OFFICE O/P EST MOD 30 MIN: CPT | Mod: 25 | Performed by: STUDENT IN AN ORGANIZED HEALTH CARE EDUCATION/TRAINING PROGRAM

## 2024-04-29 PROCEDURE — 59025 FETAL NON-STRESS TEST: CPT | Mod: 26 | Performed by: STUDENT IN AN ORGANIZED HEALTH CARE EDUCATION/TRAINING PROGRAM

## 2024-04-29 PROCEDURE — 250N000013 HC RX MED GY IP 250 OP 250 PS 637

## 2024-04-29 PROCEDURE — G0463 HOSPITAL OUTPT CLINIC VISIT: HCPCS

## 2024-04-29 PROCEDURE — 81003 URINALYSIS AUTO W/O SCOPE: CPT

## 2024-04-29 RX ORDER — ACETAMINOPHEN 325 MG/1
975 TABLET ORAL EVERY 6 HOURS PRN
Status: DISCONTINUED | OUTPATIENT
Start: 2024-04-29 | End: 2024-04-29 | Stop reason: HOSPADM

## 2024-04-29 RX ORDER — LIDOCAINE 40 MG/G
CREAM TOPICAL
Status: DISCONTINUED | OUTPATIENT
Start: 2024-04-29 | End: 2024-04-29 | Stop reason: HOSPADM

## 2024-04-29 RX ADMIN — ACETAMINOPHEN 975 MG: 325 TABLET, FILM COATED ORAL at 18:41

## 2024-04-29 ASSESSMENT — ACTIVITIES OF DAILY LIVING (ADL)
ADLS_ACUITY_SCORE: 18
ADLS_ACUITY_SCORE: 35

## 2024-04-29 NOTE — TELEPHONE ENCOUNTER
S-(situation): patient called through the call center for decreased fetal movement    B-(background): patient is currently 29+1 weeks pregnant    A-(assessment): patient stated that since last night she has not felt any kicks only pressure in her lower abdomen. Her lower back hurt last night and her right arm as well.     Patient denied any leaking of fluid/blood from her vagina, any headaches, contractions, changes in vision or RUQ pain.     R-(recommendations): I did recommend to the patient that she will need to go into L&D to have extended monitoring.     I did call the charge nurse to let her know that the patient is coming in for decreased fetal movement. I also sent a txt page to Dr. Joshua to let her know as well.     Patient stated that she will head in now. All questions answered.                     Reason for Disposition   Pregnant 23 or more weeks and baby moving less today by kick count (e.g., kick count < 5 in 1 hour or < 10 in 2 hours)    Additional Information   Negative: Blurred vision or visual change   Negative: SEVERE headache and not relieved with acetaminophen (e.g., Tylenol)   Negative: Leakage of fluid from vagina   Negative: Pregnant 20-22 weeks and has felt baby move previously, and no movement of baby > 8 hours   Negative: Discomfort when passing urine (e.g., pain, burning or stinging)   Negative: Patient wants to be seen   Negative: Fever > 100.4 F (38.0 C)   Negative: Sounds like a life-threatening emergency to the triager   Negative: Pregnant > 36 weeks (i.e., term) and having contractions or other symptoms of labor   Negative: Pregnant < 37 weeks (i.e., ) and having contractions or other symptoms of labor   Negative: Pregnant > 20 weeks and having abdominal pain   Negative: Pregnant > 20 weeks and having vaginal bleeding or spotting   Negative: Pregnant 23 or more weeks and baby moving less today AND unable (or unwilling) to perform kick count   Negative: Pregnant 23 or more  "weeks with normal kick count BUT mother still thinks there is something wrong   Negative: Pregnant 23 or more weeks with no movement of baby > 8 hours     Patient is feeling more pressure and not kicks   Negative: New hand or face swelling   Negative: Patient sounds very sick or weak to the triager   Negative: Pregnant 20 or more weeks and has not felt baby move yet   Negative: Pregnant 20 or more weeks and has felt baby move in past 8 hours   Negative: Pregnant < 20 weeks and has not felt baby move yet   Negative: Fetal hiccups, questions about   Negative: Baby moving normally OR normal kick count   Negative: Pregnant 23 or more weeks and baby moving less today AND willing to perform kick count    Answer Assessment - Initial Assessment Questions  1. FETAL MOVEMENT: \"Has the baby's movement decreased or changed significantly from normal?\" (e.g., yes, no; describe)      Movements has decreased today is feeling no kicking  2. JAYA: \"What date are you expecting to deliver?\"       7-14-24  3. PREGNANCY: \"How many weeks pregnant are you?\"       29+1 weeks  4. OTHER SYMPTOMS: \"Do you have any other symptoms?\" (e.g., abdominal pain, leaking fluid from vagina, vaginal bleeding, etc.)      No leaking of fluid, just feeling pressure in vaginal area    Protocols used: Pregnancy - Decreased Fetal Movement-A-OH      "

## 2024-04-29 NOTE — PROGRESS NOTES
[facility]  Labor & Delivery Triage Note    HPI: Irma Weinberg is a 28 year old  at 29w1d by 10w4d US here for decreased fetal movement, abdominal pressure, and right shoulder pain     Patient reports over the last day she has noticed changes in fetal movement. Previously has felt larger movements throughout the day, however now only feeling kicks in one location on her abdomen along with increased pressure. She has not tried any interventions to illicit increased movement. She has not conducted kick counts     She additionally notes increased abdominal pressure associated over the last couple of weeks. Reports that she has had increased urinary frequency, lower back pain, and has noticed brownish sedentary in her urine a couple of days. She denies vaginal bleeding, leakage of fluid, or malodorous discharge. She denies constipation or diarrhea.     Additionally reports that she has been experiencing shoulder pain for the past day. She noticed once waking up this morning. States that she has noticed pain and numbness radiating from her shoulder to her finger tips. Denies chest pain, shortness of breath, or lightheadedness.     Pregnancy notable for:  - Hep B NI, declines vaccinations   - Fetal renal dilation     Lab Results   Component Value Date    AS Negative 2023    HEPBANG Nonreactive 2023    CHPCRT Not Detected 10/30/2023    GCPCRT Not Detected 10/30/2023    HGB 10.7 (L) 04/10/2024     OBHX:  OB History    Para Term  AB Living   1 0 0 0 0 0   SAB IAB Ectopic Multiple Live Births   0 0 0 0 0      # Outcome Date GA Lbr Louie/2nd Weight Sex Type Anes PTL Lv   1 Current               Obstetric Comments   *First Pregnancy            Medical Hx:  History reviewed. No pertinent past medical history.    Surgical Hx:  History reviewed. No pertinent surgical history.    Medications:  Current Outpatient Medications   Medication Instructions     "calcium carbonate (OS-CASEY) 1500 (600 Ca) MG tablet Oral, DAILY    calcium carbonate (TUMS) 500 MG chewable tablet 1 chew tab, Oral, PRN    cyanocobalamin (VITAMIN B-12) 1,000 mcg, Oral, DAILY    ferrous sulfate (FEROSUL) 325 mg, Oral, DAILY WITH BREAKFAST    folic acid (FOLVITE) 400 mcg, Oral, DAILY    meclizine (ANTIVERT) 25 mg, AT BEDTIME    omeprazole (PRILOSEC) 20 mg, Oral, DAILY    ondansetron (ZOFRAN) 8 mg, Oral, EVERY 8 HOURS PRN    Prenatal Vit-Fe Fumarate-FA (PRENATAL MULTIVITAMIN  PLUS IRON) 27-1 MG TABS 1 tablet, Oral, DAILY    vitamin C (ASCORBIC ACID) 250 mg, Oral, DAILY    vitamin D3 (CHOLECALCIFEROL) 125 mcg, Oral, DAILY       Allergies:  No Known Allergies    FMHx:    Family History   Problem Relation Age of Onset    Diabetes Mother         Type 2    Coronary Artery Disease Father     Hypertension Father     Rheumatoid Arthritis Father     No Known Problems Sister     No Known Problems Sister     Diabetes Maternal Grandmother         type 2 diabetes    Diabetes Maternal Grandfather         type 2    Diabetes Paternal Grandmother         type 2    No Known Problems Paternal Grandfather        Social Hx:  Social History     Tobacco Use    Smoking status: Never    Smokeless tobacco: Never   Vaping Use    Vaping status: Never Used   Substance Use Topics    Alcohol use: Never    Drug use: Never     Physical Exam:  Vitals:    24 1655 24 1702   BP:  117/71   BP Location:  Left arm   Patient Position:  Semi-Fish's   Cuff Size:  Adult Regular   Resp:  18   Weight: 76 kg (167 lb 8.8 oz)    Height: 1.58 m (5' 2.21\")      GEN: resting comfortably in bed, NAD   CV: Regular rate, well perfused  PULM: On room air, no increased work of breathing  ABD: soft, gravid, non-tender, non-distended  EXT: trace edema, non tender to palpation  CVX: deferred    NST:  FHT: baseline 150, mod variability, + accels, no decels  TOCO: 2 contractions over 1 hour monitoring     A/P: 28 year old  at 29w1d by 10w4d, " here for decreased fetal movement, abdominal pressure, and shoulder pain. Pregnancy notable for above listed concerns.     # Decreased Fetal Movement  - FHR Tracing: Reactive NST  - Patient reported increased fetal movement   - Reviewed steps to illicit fetal movement at home and subsequent return precautions     # Abdominal Pressure  - UA unremarkable. Deferred vaginal swabs or cervical at this time due to low suspicion for  labor or vaginitis at this time   - Suspect this symptoms were secondary to fetal growth and descent into pelvis  - Reviewed return precautions     # Musculoskeletal Pain   Reported right shoulder pain not associated with chest pain, shortness of breath, lightheadedness.   - Vital signs reassuring   - Symptoms improved with tylenol and heat packs  - Low suspicion for cardiac etiologies at this time     # PNC  - Rh pos, GBS unknown  - GCT nl     Dispo: Home. Follow at routine prenatal visit on     Patient discussed with Dr. Lynette Montelongo MD, MPH, MS  Obstetrics, Gynecology & Women's Health   Resident, PGY-2  2024 6:56 PM     I examined Irma PEREA Jasbirelfadl with Dr. Montelongo and agree with the presentation, exam and plan of care documented in this note with edits by me.   Ana Ojeda MD

## 2024-04-29 NOTE — PROGRESS NOTES
Pt presented to birthplace at 1637 with reports of change in fetal movement, pressure where baby moves and in lower abdomen, increased fatigue and increased frequency of urination. Pt denies contractions, LOF, vaginal bleeding or pain with urination. Provider notified of arrival. EFM applied with consent.

## 2024-04-30 ENCOUNTER — TELEPHONE (OUTPATIENT)
Dept: OBGYN | Facility: CLINIC | Age: 29
End: 2024-04-30
Payer: COMMERCIAL

## 2024-04-30 NOTE — DISCHARGE INSTRUCTIONS
Learning About When to Call Your Doctor During Pregnancy (After 20 Weeks)  Overview  It's common to have concerns about what might be a problem when you're pregnant. Most pregnancies don't have any serious problems. But it's still important to know when to call your doctor if you have certain symptoms or signs of labor.  These are general suggestions. Your doctor may give you some more information about when to call.  When to call your doctor (after 20 weeks)  Call 911  anytime you think you may need emergency care. For example, call if:  You have severe vaginal bleeding. This means you are soaking through a pad each hour for 2 or more hours.  You have sudden, severe pain in your belly.  You have chest pain, are short of breath, or cough up blood.  You passed out (lost consciousness).  You have a seizure.  You see or feel the umbilical cord.  You think you are about to deliver your baby and can't make it safely to the hospital or birthing center.  Call your doctor now or seek immediate medical care if:  You have vaginal bleeding.  You have belly pain.  You have a fever.  You are dizzy or lightheaded, or you feel like you may faint.  You have signs of a blood clot in your leg (called a deep vein thrombosis), such as:  Pain in the calf, back of the knee, thigh, or groin.  Swelling in your leg or groin.  A color change on the leg or groin. The skin may be reddish or purplish, depending on your usual skin color.  You have symptoms of preeclampsia, such as:  Sudden swelling of your face, hands, or feet.  New vision problems (such as dimness, blurring, or seeing spots).  A severe headache.  You have a sudden release of fluid from your vagina. (You think your water broke.)  You've been having regular contractions for an hour. This means that you've had at least 6 contractions within 1 hour, even after you change your position and drink fluids.  You notice that your baby has stopped moving or is moving less than  "normal.  You have signs of heart failure, such as:  New or increased shortness of breath.  New or worse swelling in your legs, ankles, or feet.  Sudden weight gain, such as more than 2 to 3 pounds in a day or 5 pounds in a week.  Feeling so tired or weak that you cannot do your usual activities.  You have symptoms of a urinary tract infection. These may include:  Pain or burning when you urinate.  A frequent need to urinate without being able to pass much urine.  Pain in the flank, which is just below the rib cage and above the waist on either side of the back.  Blood in your urine.  Watch closely for changes in your health, and be sure to contact your doctor if:  You have vaginal discharge that smells bad.  You feel sad, anxious, or hopeless for more than a few days.  You have skin changes, such as a rash, itching, or a yellow color to your skin.  You have other concerns about your pregnancy.  If you have labor signs at 37 weeks or more  If you have signs of labor at 37 weeks or more, your doctor may tell you to call when your labor becomes more active. Symptoms of active labor include:  Contractions that are regular.  Contractions that are less than 5 minutes apart.  Contractions that are hard to talk through.  Follow-up care is a key part of your treatment and safety. Be sure to make and go to all appointments, and call your doctor if you are having problems. It's also a good idea to know your test results and keep a list of the medicines you take.  Where can you learn more?  Go to https://www.[employer].net/patiented  Enter N531 in the search box to learn more about \"Learning About When to Call Your Doctor During Pregnancy (After 20 Weeks).\"  Current as of: July 10, 2023               Content Version: 14.0    0444-2166 [employer].   Care instructions adapted under license by your healthcare professional. If you have questions about a medical condition or this instruction, always ask your healthcare " professional. [employer] disclaims any warranty or liability for your use of this information.

## 2024-04-30 NOTE — TELEPHONE ENCOUNTER
Pt seen in triage on  for decreased fetal movement, abdominal pressure, and right shoulder pain. Also had had increased urinary frequency, lower back pain, and has noticed brownish sedentary in her urine a couple of days. UA done.     Per hospital note:  - UA unremarkable. Deferred vaginal swabs or cervical at this time due to low suspicion for  labor or vaginitis at this time   - Suspect this symptoms were secondary to fetal growth and descent into pelvis    Will route to provider

## 2024-04-30 NOTE — TELEPHONE ENCOUNTER
[facility] Call Center    Phone Message    May a detailed message be left on voicemail: yes     Reason for Call: Requesting Results     Name/type of test: UA lab  Date of test: 4/29  Was test done at a location other than [facility] (Please fill in the location if not [facility])?: No    Action Taken: Other: OBGYN    Travel Screening: Not Applicable

## 2024-04-30 NOTE — PLAN OF CARE
Data: Patient assessed in the Birthplace for decreased fetal movement. Cervical exam deferred. Membranes intact. Contractions are present. Contactions are  , 4-11 minutes apart, and last 40-60 seconds. Uterine assessment is no contractions during contractions and soft by palpation at rest. See flowsheets for fetal assessment documentation. Negative urinalysis.     Action: Presumed adequate fetal oxygenation documented, audible fetal movements with monitoring. Decrease in right shoulder and neck following heat and tylenol. Discharge instructions reviewed. Patient instructed to report change in fetal movement, vaginal leaking of fluid or bleeding, abdominal pain, or any concerns related to the pregnancy to provider/clinic.      Response: Orders to discharge home per Dr. Montelongo and Dr. Ojeda. Patient verbalized understanding of education and agreement with plan. Discharged to home at 2010.

## 2024-04-30 NOTE — TELEPHONE ENCOUNTER
Called Irma with no answer; LI Solis called back. Explained that her UAwas unremarkable per MD, and that the bacteria and mucus seen in the sample were likely due from contamination from the vaginal tract (especially in the absence of any LE or nitrites). She still had questions about her elevated protein albumin, which I am unable to answer so will wait for provider response.

## 2024-04-30 NOTE — TELEPHONE ENCOUNTER
[facility] Call Center    Phone Message    May a detailed message be left on voicemail: no     Reason for Call: Other: Pt's spouse called regarding lab results. Pt received her lab results and one of the results states she has bacteria in her urine. They are concerned because they have not heard from anyone and wonder if this requires a medication. Please review and call pt to discuss.     Action Taken: Other: OBGYN    Travel Screening: Not Applicable

## 2024-05-02 ENCOUNTER — TELEPHONE (OUTPATIENT)
Dept: OBGYN | Facility: CLINIC | Age: 29
End: 2024-05-02
Payer: COMMERCIAL

## 2024-05-02 ENCOUNTER — HOSPITAL ENCOUNTER (OUTPATIENT)
Facility: CLINIC | Age: 29
Discharge: HOME | End: 2024-05-02
Attending: ADVANCED PRACTICE MIDWIFE | Admitting: OBSTETRICS & GYNECOLOGY
Payer: COMMERCIAL

## 2024-05-02 VITALS — RESPIRATION RATE: 18 BRPM | SYSTOLIC BLOOD PRESSURE: 95 MMHG | DIASTOLIC BLOOD PRESSURE: 52 MMHG | TEMPERATURE: 98.1 F

## 2024-05-02 DIAGNOSIS — D50.9 IRON DEFICIENCY ANEMIA DURING PREGNANCY: ICD-10-CM

## 2024-05-02 DIAGNOSIS — O99.019 IRON DEFICIENCY ANEMIA DURING PREGNANCY: ICD-10-CM

## 2024-05-02 LAB
ALBUMIN SERPL BCG-MCNC: 3.2 G/DL (ref 3.5–5.2)
ALP SERPL-CCNC: 80 U/L (ref 40–150)
ALT SERPL W P-5'-P-CCNC: 8 U/L (ref 0–50)
ANION GAP SERPL CALCULATED.3IONS-SCNC: 11 MMOL/L (ref 7–15)
AST SERPL W P-5'-P-CCNC: 13 U/L (ref 0–45)
BILIRUB SERPL-MCNC: <0.2 MG/DL
BUN SERPL-MCNC: 8.5 MG/DL (ref 6–20)
CALCIUM SERPL-MCNC: 8.1 MG/DL (ref 8.6–10)
CHLORIDE SERPL-SCNC: 102 MMOL/L (ref 98–107)
CREAT SERPL-MCNC: 0.46 MG/DL (ref 0.51–0.95)
DEPRECATED HCO3 PLAS-SCNC: 21 MMOL/L (ref 22–29)
EGFRCR SERPLBLD CKD-EPI 2021: >90 ML/MIN/1.73M2
ERYTHROCYTE [DISTWIDTH] IN BLOOD BY AUTOMATED COUNT: 13.5 % (ref 10–15)
GLUCOSE SERPL-MCNC: 104 MG/DL (ref 70–99)
HCT VFR BLD AUTO: 31.3 % (ref 35–47)
HGB BLD-MCNC: 10.3 G/DL (ref 11.7–15.7)
MCH RBC QN AUTO: 29.3 PG (ref 26.5–33)
MCHC RBC AUTO-ENTMCNC: 32.9 G/DL (ref 31.5–36.5)
MCV RBC AUTO: 89 FL (ref 78–100)
PLATELET # BLD AUTO: 256 10E3/UL (ref 150–450)
POTASSIUM SERPL-SCNC: 3.2 MMOL/L (ref 3.4–5.3)
PROT SERPL-MCNC: 6 G/DL (ref 6.4–8.3)
RBC # BLD AUTO: 3.51 10E6/UL (ref 3.8–5.2)
SODIUM SERPL-SCNC: 134 MMOL/L (ref 135–145)
WBC # BLD AUTO: 8.6 10E3/UL (ref 4–11)

## 2024-05-02 PROCEDURE — 82728 ASSAY OF FERRITIN: CPT

## 2024-05-02 PROCEDURE — G0463 HOSPITAL OUTPT CLINIC VISIT: HCPCS

## 2024-05-02 PROCEDURE — 36415 COLL VENOUS BLD VENIPUNCTURE: CPT

## 2024-05-02 PROCEDURE — 80053 COMPREHEN METABOLIC PANEL: CPT

## 2024-05-02 PROCEDURE — 85027 COMPLETE CBC AUTOMATED: CPT

## 2024-05-02 RX ORDER — LIDOCAINE 40 MG/G
CREAM TOPICAL
Status: DISCONTINUED | OUTPATIENT
Start: 2024-05-02 | End: 2024-05-03 | Stop reason: HOSPADM

## 2024-05-02 ASSESSMENT — ACTIVITIES OF DAILY LIVING (ADL)
ADLS_ACUITY_SCORE: 18
ADLS_ACUITY_SCORE: 18
ADLS_ACUITY_SCORE: 35
ADLS_ACUITY_SCORE: 18

## 2024-05-02 NOTE — TELEPHONE ENCOUNTER
"Irma was transferred to me by the call center.  I spoke with her and her , Vic.     She was seen in L+D triage on 4/29 for decreased fetal movement and lower abdominal pressure. She was discharged to home and instructed to follow up in clinic at next appointment.     She is now describing similar symptoms.  She is fatigued, has a backache that goes down into her legs, low abdominal pain that feels crampy \"like menstrual cramps,\" full body aches, nausea without vomiting, headache, and dizziness.  She denies fever, vision changes, or changes in vaginal discharge.      She does not have a known exposure to anyone who is ill.      Her next appointment is 5/8.      Routed to MD on call to advise.     "

## 2024-05-02 NOTE — PLAN OF CARE
Patient arrived from home with complaints of weakness, fatigue, headache, cramping, back pain, nausea and dizziness. Denies emesis, states eating/drinking as normal. Denies vaginal bleeding or LOF. + FM. Afebrile, VSS. Placed on EFM/toco with consent, FHT category 1. Pt is 30, . Given hot packs, helpful per pt.  RN notified MD Morillo of arrival, awaiting assessment of MD. Pt accompanied by  Abdil.

## 2024-05-02 NOTE — TELEPHONE ENCOUNTER
Per Dr. Ramos, have Irma head to triage.  I called L+D and gave report and got approval for her to head in.      I called Irma and Vic and instructed them to head to L+D.

## 2024-05-03 ENCOUNTER — TELEPHONE (OUTPATIENT)
Dept: OBGYN | Facility: CLINIC | Age: 29
End: 2024-05-03
Payer: COMMERCIAL

## 2024-05-03 DIAGNOSIS — D50.9 IRON DEFICIENCY ANEMIA DURING PREGNANCY: Primary | ICD-10-CM

## 2024-05-03 DIAGNOSIS — O99.019 IRON DEFICIENCY ANEMIA DURING PREGNANCY: Primary | ICD-10-CM

## 2024-05-03 RX ORDER — EPINEPHRINE 1 MG/ML
0.3 INJECTION, SOLUTION, CONCENTRATE INTRAVENOUS EVERY 5 MIN PRN
Status: CANCELLED | OUTPATIENT
Start: 2024-05-03

## 2024-05-03 RX ORDER — ALBUTEROL SULFATE 90 UG/1
1-2 AEROSOL, METERED RESPIRATORY (INHALATION)
Status: CANCELLED
Start: 2024-05-03

## 2024-05-03 RX ORDER — MEPERIDINE HYDROCHLORIDE 25 MG/ML
25 INJECTION INTRAMUSCULAR; INTRAVENOUS; SUBCUTANEOUS EVERY 30 MIN PRN
Status: CANCELLED | OUTPATIENT
Start: 2024-05-03

## 2024-05-03 RX ORDER — HEPARIN SODIUM,PORCINE 10 UNIT/ML
5-20 VIAL (ML) INTRAVENOUS DAILY PRN
Status: CANCELLED | OUTPATIENT
Start: 2024-05-03

## 2024-05-03 RX ORDER — ALBUTEROL SULFATE 0.83 MG/ML
2.5 SOLUTION RESPIRATORY (INHALATION)
Status: CANCELLED | OUTPATIENT
Start: 2024-05-03

## 2024-05-03 RX ORDER — HEPARIN SODIUM (PORCINE) LOCK FLUSH IV SOLN 100 UNIT/ML 100 UNIT/ML
5 SOLUTION INTRAVENOUS
Status: CANCELLED | OUTPATIENT
Start: 2024-05-03

## 2024-05-03 RX ORDER — DIPHENHYDRAMINE HYDROCHLORIDE 50 MG/ML
50 INJECTION INTRAMUSCULAR; INTRAVENOUS
Status: CANCELLED
Start: 2024-05-03

## 2024-05-03 NOTE — PROGRESS NOTES
"[facility]  Labor & Delivery Triage Note    HPI: Irma Weinberg is a 28 year old  at 29w4d by 10w4d US here for fatigue and generalized malaise. Patient reports over the last 2-3 days she has become more fatigued. Yesterday she was so exhausted she found it difficult to stand and move around. She reports during this time she has experienced generalized muscle aches mostly I her shoulder and lower back. Has been having some associating nausea, however no emesis and ultimately has been able to tolerate po intake without concerns. She denies recent sick contacts, has not eaten any spoiled foods to her knowledge. She denies subjective fevers, chills, or abdominal pain. No constipation or diarrhea.     Otherwise denies dysuria, urinary frequency, vaginal bleeding, malodorous discharge, or LOF. Feeling good FM    Pregnancy notable for:  - Hep B NI, declines vaccinations   - Fetal renal dilation     Lab Results   Component Value Date    AS Negative 2023    HEPBANG Nonreactive 2023    CHPCRT Not Detected 10/30/2023    GCPCRT Not Detected 10/30/2023    HGB 10.3 (L) 2024       GBS Status:   No results found for: \"GBS\"    OBHX:  OB History    Para Term  AB Living   1 0 0 0 0 0   SAB IAB Ectopic Multiple Live Births   0 0 0 0 0      # Outcome Date GA Lbr Louie/2nd Weight Sex Type Anes PTL Lv   1 Current               Obstetric Comments   *First Pregnancy          Medical Hx:  History reviewed. No pertinent past medical history.    Surgical Hx:  History reviewed. No pertinent surgical history.    Medications:  Current Outpatient Medications   Medication Instructions    calcium carbonate (OS-CASYE) 1500 (600 Ca) MG tablet Oral, DAILY    calcium carbonate (TUMS) 500 MG chewable tablet 1 chew tab, Oral, PRN    cyanocobalamin (VITAMIN B-12) 1,000 mcg, Oral, DAILY    ferrous sulfate (FEROSUL) 325 mg, Oral, DAILY WITH BREAKFAST    folic acid (FOLVITE) " 400 mcg, Oral, DAILY    meclizine (ANTIVERT) 25 mg, Oral, AT BEDTIME    omeprazole (PRILOSEC) 20 mg, Oral, DAILY    ondansetron (ZOFRAN) 8 mg, Oral, EVERY 8 HOURS PRN    Prenatal Vit-Fe Fumarate-FA (PRENATAL MULTIVITAMIN  PLUS IRON) 27-1 MG TABS 1 tablet, Oral, DAILY    vitamin C (ASCORBIC ACID) 250 mg, Oral, DAILY    vitamin D3 (CHOLECALCIFEROL) 125 mcg, Oral, DAILY       Allergies:  No Known Allergies    FMHx:    Family History   Problem Relation Age of Onset    Diabetes Mother         Type 2    Coronary Artery Disease Father     Hypertension Father     Rheumatoid Arthritis Father     No Known Problems Sister     No Known Problems Sister     Diabetes Maternal Grandmother         type 2 diabetes    Diabetes Maternal Grandfather         type 2    Diabetes Paternal Grandmother         type 2    No Known Problems Paternal Grandfather        Social Hx:  Social History     Tobacco Use    Smoking status: Never    Smokeless tobacco: Never   Vaping Use    Vaping status: Never Used   Substance Use Topics    Alcohol use: Never    Drug use: Never         ROS: 10-point ROS negative except as in HPI.    Physical Exam:  Vitals:    24 1809 24 195   BP: 95/52    BP Location: Left arm    Patient Position: Semi-Fish's    Cuff Size: Adult Regular    Resp:  18   Temp: 98.3  F (36.8  C) 98.1  F (36.7  C)   TempSrc: Oral Oral     GEN: resting comfortably in bed, NAD   CV: Regular rate, well perfused  PULM: On room air, no increased work of breathing  ABD: soft, gravid, non-tender, non-distended  EXT: trace  edema, non tender to palpation    NST:  FHT: baseline 140, mod variability, + accels, no decels  TOCO: quiet    A/P: 28 year old  at 29w4d by 10w4d US, here for generalized malaise and fatigue. Pregnancy notable for above listed concerns.     # Generalized Fatigue  # Symptomatic Anemia of Pregnancy   Patient presents with generalized fatigue over the past couple of days. Differential diagnosis broad,  including  infectious etiologies such as gastroenteritis, pyelonephritis, appendicitis, viral infection, among other conditions. Patient vitally stable and well appearing. Low suspicion for an acute illness. Workup notable for anemia. Suspect that this could be contributory to patient overall presentation.   - CMP with mild hypokalemia. CBC with hemoglobin of 10.3, stable from last assessment   - Suspect patient is symptomatic due to anemia of pregnancy. However no acute indication for blood transfusion or IV iron at this time  - Given persistent anemia would recommend further workup of iron studies, can complete at next clinic visit.   - Will plan to establish outpatient IV iron transfusions (primary clinic has been messaged).     # PNC  - Rh pos, GBS unknown  - GCT nl      Dispo: Home. Follow at routine prenatal visit on 5/8 with Collette Dillon CNM     Patient discussed with Dr. Natalie Montelongo MD, MPH, MS  Obstetrics, Gynecology & Women's Health   Resident, PGY-2  05/03/2024 3:56 AM     I discussed Irma Robisonadl on 5/3/2024 with Dr. Montelongo and agree with the presentation, exam and plan of care documented in this note with edits by me.   Destiny Estrada MD MPH

## 2024-05-03 NOTE — PROGRESS NOTES
"Data: Patient assessed in the [facility] for  feeling \"unwell\" Patient had been experiencing body aches, cramping, back pain, headaches, dizzy, and fatigue . Membranes intact. Contractions are not present. See flowsheets for fetal assessment documentation.     Action: Presumed adequate fetal oxygenation documented. Discharge instructions reviewed. Patient instructed to report change in fetal movement, vaginal leaking of fluid or bleeding, abdominal pain, or any concerns related to the pregnancy to provider/clinic.      Response: Orders to discharge home per Dr. Proctor G2 Resident. Patient verbalized understanding of education and agreement with plan. Discharged to home at 2200.              "

## 2024-05-03 NOTE — PROVIDER NOTIFICATION
05/02/24 2023   Provider Notification   Provider Name/Title Dr Montelongo   Method of Notification Electronic Page   Notification Reason Other (Comment)     Notified MD that pt reports diagnosis of anemia at last prenatal appointment, records show this as 4/10, and pt has not had a hgb since then. Requested that we consider this.

## 2024-05-03 NOTE — TELEPHONE ENCOUNTER
Attempted to call [facility] to discuss IV iron infusion orders; no answer, LVM with infusion center's phone number for scheduling.

## 2024-05-03 NOTE — TELEPHONE ENCOUNTER
----- Message from Ana Ojeda MD sent at 5/3/2024  8:51 AM CDT -----  Regarding: RE: IV iron transfusions  Orders placed.  ----- Message -----  From: Claire Diaz RN  Sent: 5/3/2024   8:10 AM CDT  To: Robb Montelongo MD; Ana Ojeda MD  Subject: RE: IV iron transfusions                         Hello! Outpatient IV iron infusion orders have to be ordered from the provider. Can you please place the orders? Once the therapy orders are in, we will direct the patient to the infusion center.     Thank you,  MARTHA Rangel  ----- Message -----  From: Ira Mcdonald  Sent: 5/3/2024   8:05 AM CDT  To: [facility]  Subject: FW: IV iron transfusions                           ----- Message -----  From: Robb Montelongo MD  Sent: 5/3/2024   3:50 AM CDT  To: [facility]  Subject: IV iron transfusions                             Hi,      Can you please set up this patient for outpatient IV iron infusions. Thank you     Samantha

## 2024-05-07 NOTE — PATIENT INSTRUCTIONS
Weeks 26 to 30 of Your Pregnancy: Care Instructions  You're starting your last trimester. You'll probably feel your baby moving around more. Your back may ache as your body gets used to your baby's size and length. Take care of yourself, and pay attention to what your body needs.    Talk to your doctor about getting the Tdap shot. It will help protect your  against whooping cough (pertussis). Also ask your doctor about flu and COVID-19 shots if you haven't had them yet. If your blood type is Rh negative, you may be given a shot of Rh immune globulin (such as RhoGAM). It can help prevent problems for your baby.    You may have Geo-Rios contractions. They are single or several strong contractions without a pattern. These are practice contractions but not the start of labor.  Be kind to yourself.     Take breaks when you're tired.  Change positions often. Don't sit for too long or stand for too long.  At work, rest during breaks if you can. If you don't get breaks, talk to your doctor about writing a letter to your employer to request them.  Avoid fumes, chemicals, and tobacco smoke.  Be sexual if you want to.     You may be interested in sex, or you may not. Everyone is different.  Sex is okay unless your doctor tells you not to.  Your belly can make it hard to find good positions for sex. Bourg and explore.  Watch for signs of  labor.    These signs include:   Menstrual-like cramps. Or you may have pain or pressure in your pelvis that happens in a pattern.  About 6 or more contractions in an hour (even after rest and a glass of water).  A low, dull backache that doesn't go away when you change positions.  An increase or change in vaginal discharge.  Light vaginal bleeding or spotting.  Your water breaking.  Know what to do if you think you are having contractions.     Drink 1 or 2 glasses of water.  Lie down on your left side for at least an hour.  While on your side, feel the top of your  "belly to see if it's tight.  Write down your contractions for an hour. Time how long it is from the start of one contraction to the start of the next.  Call your doctor if you have regular contractions.  Follow-up care is a key part of your treatment and safety. Be sure to make and go to all appointments, and call your doctor if you are having problems. It's also a good idea to know your test results and keep a list of the medicines you take.  Where can you learn more?  Go to https://www.mSpot.net/patiented  Enter S999 in the search box to learn more about \"Weeks 26 to 30 of Your Pregnancy: Care Instructions.\"  Current as of: July 10, 2023               Content Version: 14.0    0557-4655 [employer].   Care instructions adapted under license by your healthcare professional. If you have questions about a medical condition or this instruction, always ask your healthcare professional. [employer] disclaims any warranty or liability for your use of this information.      Weeks 30 to 32 of Your Pregnancy: Care Instructions  Your baby is growing more every day. Its eyes can open and close, and it may have hair on its head. Your baby may sleep 20 to 45 minutes at a time and is more active at certain times.    You should feel your baby move several times every day. Your baby now turns less and kicks more.    This is a good time to tour your hospital or birthing center. You may also want to find childcare if needed.    To ease heartburn    Avoid foods that make your symptoms worse, such as chocolate, spicy foods, and caffeine.  Avoid bending over or lying down after meals.  Do not eat for 2 hours before bedtime.  Take antacids like Tums, but don't take ones that have sodium bicarbonate, magnesium trisilicate, or aspirin.    To care for large, swollen veins (varicose veins)    Try to avoid standing for long periods of time.  Sit with your feet propped up.  Wear support hose.  Get some exercise " "every day, like walking or swimming.  Counting your baby's kicks  Your doctor may ask you to count your baby's movements, such as kicks, flutters, or rolls.    Find a quiet place, and get comfortable. Write down your start time. Count your baby's movements (except hiccups). When your baby has moved 10 times, you can stop counting. Write down how many minutes it took.    If an hour goes by and you don't feel 10 movements, have something to eat or drink. Count for another hour. If you don't feel at least 10 movements in the 2-hour period, call your doctor.  Follow-up care is a key part of your treatment and safety. Be sure to make and go to all appointments, and call your doctor if you are having problems. It's also a good idea to know your test results and keep a list of the medicines you take.  Where can you learn more?  Go to https://www.OmegaGenesis.Circular Energy/patiented  Enter X471 in the search box to learn more about \"Weeks 30 to 32 of Your Pregnancy: Care Instructions.\"  Current as of: July 10, 2023               Content Version: 14.0    3598-2736 [email or web address].   Care instructions adapted under license by your healthcare professional. If you have questions about a medical condition or this instruction, always ask your healthcare professional. [email or web address] disclaims any warranty or liability for your use of this information.      Learning About Birth Control After Childbirth  What is birth control?  Birth control is any method used to prevent pregnancy. If you have vaginal sex without birth control, you could get pregnant--even if you haven't started having periods again. You're less likely to get pregnant while breastfeeding, but it's still possible. Finding birth control that works for you can help avoid an unplanned pregnancy.  There are many kinds of birth control. Each has pros and cons. Find what works for you. Talk to your doctor if you've just given birth or are " "breastfeeding.    Long-acting reversible contraception (LARC). These are placed inside your body by a doctor. They can prevent pregnancy for years.  Examples include:  An implant (hormonal).  Copper intrauterine device (IUD).  Hormonal IUDs.    Short-acting hormonal methods. These release hormones. Examples include:  Combination birth control pills (\"the pill\").  Skin patches.  A vaginal ring.  A shot.  Mini-pills. Choose progestin-only options soon after giving birth.    Barrier methods. Use these every time you have vaginal sex.  Examples include:  External (male) condoms.  Internal (female) condoms.  Diaphragms.  Cervical caps.  Sponges.    Spermicides. These kill sperm or stop sperm from moving. They can be gels, creams, foams, films, or tablets. Use them before vaginal sex.  Examples include:  Nonoxynol-9.  pH regulator gel.    Permanent birth control (sterilization). This can be an option if you're sure that you don't want to get pregnant later.  Examples include:  Vasectomy.  Having tubes tied (tubal ligation).    Emergency contraception. This is a backup method. Use it if you didn't use birth control or your birth control method failed.  Examples include:  Copper and hormonal IUDs.  Emergency contraceptive pills.    Fertility awareness. You'll learn when you are most likely to become pregnant (are fertile). You can avoid vaginal sex at that time.  It's also called:  Natural family planning.  The rhythm method.    Breastfeeding. This is most effective when all of these are true:  Your baby is younger than 6 months old.  You're breastfeeding and not bottle-feeding at all.  You aren't having periods.  Follow-up care is a key part of your treatment and safety. Be sure to make and go to all appointments, and call your doctor if you are having problems. It's also a good idea to know your test results and keep a list of the medicines you take.  Where can you learn more?  Go to " "https://www.[employer].net/patiented  Enter X408 in the search box to learn more about \"Learning About Birth Control After Childbirth.\"  Current as of: July 10, 2023               Content Version: 14.0    6595-6770 [employer].   Care instructions adapted under license by your healthcare professional. If you have questions about a medical condition or this instruction, always ask your healthcare professional. [employer] disclaims any warranty or liability for your use of this information.      Weeks 30 to 32 of Your Pregnancy: Care Instructions  Your baby is growing more every day. Its eyes can open and close, and it may have hair on its head. Your baby may sleep 20 to 45 minutes at a time and is more active at certain times.    You should feel your baby move several times every day. Your baby now turns less and kicks more.    This is a good time to tour your hospital or birthing center. You may also want to find childcare if needed.    To ease heartburn    Avoid foods that make your symptoms worse, such as chocolate, spicy foods, and caffeine.  Avoid bending over or lying down after meals.  Do not eat for 2 hours before bedtime.  Take antacids like Tums, but don't take ones that have sodium bicarbonate, magnesium trisilicate, or aspirin.    To care for large, swollen veins (varicose veins)    Try to avoid standing for long periods of time.  Sit with your feet propped up.  Wear support hose.  Get some exercise every day, like walking or swimming.  Counting your baby's kicks  Your doctor may ask you to count your baby's movements, such as kicks, flutters, or rolls.    Find a quiet place, and get comfortable. Write down your start time. Count your baby's movements (except hiccups). When your baby has moved 10 times, you can stop counting. Write down how many minutes it took.    If an hour goes by and you don't feel 10 movements, have something to eat or drink. Count for another hour. If you " "don't feel at least 10 movements in the 2-hour period, call your doctor.  Follow-up care is a key part of your treatment and safety. Be sure to make and go to all appointments, and call your doctor if you are having problems. It's also a good idea to know your test results and keep a list of the medicines you take.  Where can you learn more?  Go to https://www.SETiT.net/patiented  Enter X471 in the search box to learn more about \"Weeks 30 to 32 of Your Pregnancy: Care Instructions.\"  Current as of: July 10, 2023               Content Version: 14.0    4857-7378 [employer].   Care instructions adapted under license by your healthcare professional. If you have questions about a medical condition or this instruction, always ask your healthcare professional. [employer] disclaims any warranty or liability for your use of this information.      "

## 2024-05-08 ENCOUNTER — LAB (OUTPATIENT)
Dept: LAB | Facility: CLINIC | Age: 29
End: 2024-05-08
Attending: ADVANCED PRACTICE MIDWIFE
Payer: COMMERCIAL

## 2024-05-08 ENCOUNTER — PRENATAL OFFICE VISIT (OUTPATIENT)
Dept: OBGYN | Facility: CLINIC | Age: 29
End: 2024-05-08
Attending: ADVANCED PRACTICE MIDWIFE
Payer: COMMERCIAL

## 2024-05-08 VITALS
HEIGHT: 62 IN | DIASTOLIC BLOOD PRESSURE: 67 MMHG | HEART RATE: 73 BPM | BODY MASS INDEX: 31.82 KG/M2 | WEIGHT: 172.9 LBS | SYSTOLIC BLOOD PRESSURE: 96 MMHG

## 2024-05-08 DIAGNOSIS — Z34.90 NORMAL INTRAUTERINE PREGNANCY, ANTEPARTUM: ICD-10-CM

## 2024-05-08 DIAGNOSIS — D50.9 IRON DEFICIENCY ANEMIA DURING PREGNANCY: ICD-10-CM

## 2024-05-08 DIAGNOSIS — Z34.93 NORMAL PREGNANCY IN THIRD TRIMESTER: ICD-10-CM

## 2024-05-08 DIAGNOSIS — O99.019 IRON DEFICIENCY ANEMIA DURING PREGNANCY: ICD-10-CM

## 2024-05-08 DIAGNOSIS — Z34.93 NORMAL PREGNANCY IN THIRD TRIMESTER: Primary | ICD-10-CM

## 2024-05-08 PROBLEM — Z36.89 ENCOUNTER FOR TRIAGE IN PREGNANT PATIENT: Status: RESOLVED | Noted: 2024-04-29 | Resolved: 2024-05-08

## 2024-05-08 LAB
ALBUMIN SERPL BCG-MCNC: 3.3 G/DL (ref 3.5–5.2)
ALP SERPL-CCNC: 105 U/L (ref 40–150)
ALT SERPL W P-5'-P-CCNC: 11 U/L (ref 0–50)
ANION GAP SERPL CALCULATED.3IONS-SCNC: 8 MMOL/L (ref 7–15)
AST SERPL W P-5'-P-CCNC: 17 U/L (ref 0–45)
BILIRUB SERPL-MCNC: <0.2 MG/DL
BUN SERPL-MCNC: 5.5 MG/DL (ref 6–20)
CALCIUM SERPL-MCNC: 8.3 MG/DL (ref 8.6–10)
CHLORIDE SERPL-SCNC: 104 MMOL/L (ref 98–107)
CREAT SERPL-MCNC: 0.46 MG/DL (ref 0.51–0.95)
DEPRECATED HCO3 PLAS-SCNC: 24 MMOL/L (ref 22–29)
EGFRCR SERPLBLD CKD-EPI 2021: >90 ML/MIN/1.73M2
FERRITIN SERPL-MCNC: 24 NG/ML (ref 6–175)
GLUCOSE SERPL-MCNC: 87 MG/DL (ref 70–99)
IRON BINDING CAPACITY (ROCHE): 360 UG/DL (ref 240–430)
IRON SATN MFR SERPL: 23 % (ref 15–46)
IRON SERPL-MCNC: 84 UG/DL (ref 37–145)
POTASSIUM SERPL-SCNC: 4.3 MMOL/L (ref 3.4–5.3)
PROT SERPL-MCNC: 6.2 G/DL (ref 6.4–8.3)
SODIUM SERPL-SCNC: 136 MMOL/L (ref 135–145)

## 2024-05-08 PROCEDURE — 36415 COLL VENOUS BLD VENIPUNCTURE: CPT

## 2024-05-08 PROCEDURE — 99207 PR PRENATAL VISIT: CPT | Performed by: ADVANCED PRACTICE MIDWIFE

## 2024-05-08 PROCEDURE — 87086 URINE CULTURE/COLONY COUNT: CPT

## 2024-05-08 PROCEDURE — 99212 OFFICE O/P EST SF 10 MIN: CPT | Performed by: ADVANCED PRACTICE MIDWIFE

## 2024-05-08 PROCEDURE — 83550 IRON BINDING TEST: CPT

## 2024-05-08 PROCEDURE — 82040 ASSAY OF SERUM ALBUMIN: CPT

## 2024-05-08 NOTE — PROGRESS NOTES
"Subjective:      28 year old  at 30w3d presentst for a routine prenatal appointment.     no vaginal bleeding or leakage of fluid.  Denies concerning contractions or cramping.   Reports regular fetal movement.       No HA, visual changes, RUQ or epigastric pain.    Patient concerns:    Would like to discuss lab results from triage visit.  Noticed bacteria in urine analysis, wondering if it needs to be treated.  Denies any current or past urinary symptoms. Consents to UC today.  Worried about low potassium found on CMP, wondering if she needs a supplement.  Consents to repeat CMP today.      Has IV iron transfusion scheduled for tomorrow, consents to iron studies today.    Reviewed questions about birth plan, patient to bring plan to labor.   Has follow up ultrasound scheduled for fetal renal dilation.     Discussed recommendation for Tdap in pregnancy.  Patient consents to receive vaccine today.    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 2023     Priority: Medium     Gave info, considering      Normal intrauterine pregnancy, antepartum 2023     Priority: Medium     [facility] choice: MD group   Partner's name: Vic  [x]LAWSONB folder  [x]Dating  [x] 1st trimester screening: MFM referral placed for 1st trimester screening place  [x]Offer AFP after 15 wks  [x]Fetal anatomy US ordered  [x]Rubella immune  [x]Hep B nonimmune-considering immunization  [x]Varicella immune  [x]Pap at [facility] signed  [x] No added risk for PRE-E  [x] GDM risk, recommended early GCT and hgb A1C  [x]No need for utox " in labor  [x]COVID vaccine completed  _____________________________________  [x]EOB folder  [x]PP Contraception plan: condoms   [x]Labor plans:spouse and maybe mom if she gets visa from [place]   []:  [x]Infant feeding plan Breast   [x]FLU shot  [x]TDAP given 5/8/24  [x]RSV  NA  [x]Rhogam if needed, date:  NA   []TOLAC consent done  NA  [] Water birth interest  []GCT, passed    ________________________________________  [] OTC PP meds sent  []PP plans, time off, support system discussed, resources offered  []Planning CS-ERAS pkt          Orders Placed This Encounter   Procedures    Ferritin    Iron and Iron Binding Capacity    Comprehensive metabolic panel    Urine Culture Aerobic Bacterial       Updated individualized prenatal care plan on the problem list for 3rd trimester    Return to clinic in 2 weeks and prn if questions or concerns.     Collette Dillon, ALFREDO FELICIANOM

## 2024-05-09 ENCOUNTER — INFUSION THERAPY VISIT (OUTPATIENT)
Dept: INFUSION THERAPY | Facility: CLINIC | Age: 29
End: 2024-05-09
Attending: STUDENT IN AN ORGANIZED HEALTH CARE EDUCATION/TRAINING PROGRAM
Payer: COMMERCIAL

## 2024-05-09 VITALS
DIASTOLIC BLOOD PRESSURE: 59 MMHG | HEART RATE: 89 BPM | TEMPERATURE: 98 F | SYSTOLIC BLOOD PRESSURE: 95 MMHG | OXYGEN SATURATION: 97 % | RESPIRATION RATE: 14 BRPM

## 2024-05-09 DIAGNOSIS — D50.9 IRON DEFICIENCY ANEMIA DURING PREGNANCY: Primary | ICD-10-CM

## 2024-05-09 DIAGNOSIS — O99.019 IRON DEFICIENCY ANEMIA DURING PREGNANCY: Primary | ICD-10-CM

## 2024-05-09 LAB — BACTERIA UR CULT: NORMAL

## 2024-05-09 PROCEDURE — 250N000011 HC RX IP 250 OP 636: Performed by: STUDENT IN AN ORGANIZED HEALTH CARE EDUCATION/TRAINING PROGRAM

## 2024-05-09 PROCEDURE — 96365 THER/PROPH/DIAG IV INF INIT: CPT

## 2024-05-09 PROCEDURE — 96366 THER/PROPH/DIAG IV INF ADDON: CPT

## 2024-05-09 PROCEDURE — 258N000003 HC RX IP 258 OP 636: Performed by: STUDENT IN AN ORGANIZED HEALTH CARE EDUCATION/TRAINING PROGRAM

## 2024-05-09 RX ORDER — ALBUTEROL SULFATE 0.83 MG/ML
2.5 SOLUTION RESPIRATORY (INHALATION)
Status: CANCELLED | OUTPATIENT
Start: 2024-05-11

## 2024-05-09 RX ORDER — METHYLPREDNISOLONE SODIUM SUCCINATE 125 MG/2ML
125 INJECTION, POWDER, LYOPHILIZED, FOR SOLUTION INTRAMUSCULAR; INTRAVENOUS
Status: CANCELLED
Start: 2024-05-11

## 2024-05-09 RX ORDER — HEPARIN SODIUM (PORCINE) LOCK FLUSH IV SOLN 100 UNIT/ML 100 UNIT/ML
5 SOLUTION INTRAVENOUS
Status: CANCELLED | OUTPATIENT
Start: 2024-05-11

## 2024-05-09 RX ORDER — EPINEPHRINE 1 MG/ML
0.3 INJECTION, SOLUTION INTRAMUSCULAR; SUBCUTANEOUS EVERY 5 MIN PRN
Status: CANCELLED | OUTPATIENT
Start: 2024-05-11

## 2024-05-09 RX ORDER — ALBUTEROL SULFATE 90 UG/1
1-2 AEROSOL, METERED RESPIRATORY (INHALATION)
Status: CANCELLED
Start: 2024-05-11

## 2024-05-09 RX ORDER — HEPARIN SODIUM,PORCINE 10 UNIT/ML
5-20 VIAL (ML) INTRAVENOUS DAILY PRN
Status: CANCELLED | OUTPATIENT
Start: 2024-05-11

## 2024-05-09 RX ORDER — DIPHENHYDRAMINE HYDROCHLORIDE 50 MG/ML
50 INJECTION INTRAMUSCULAR; INTRAVENOUS
Status: CANCELLED
Start: 2024-05-11

## 2024-05-09 RX ORDER — MEPERIDINE HYDROCHLORIDE 25 MG/ML
25 INJECTION INTRAMUSCULAR; INTRAVENOUS; SUBCUTANEOUS EVERY 30 MIN PRN
Status: CANCELLED | OUTPATIENT
Start: 2024-05-11

## 2024-05-09 RX ADMIN — IRON SUCROSE 300 MG: 20 INJECTION, SOLUTION INTRAVENOUS at 12:34

## 2024-05-09 ASSESSMENT — PAIN SCALES - GENERAL: PAINLEVEL: NO PAIN (0)

## 2024-05-09 NOTE — PATIENT INSTRUCTIONS
Dear Irma Weinberg    Thank you for choosing [facility] ([facility]) for your infusion.  The following information is a summary of our appointment as well as important reminders.      We look forward in seeing you on your next appointment here at [facility] ([facility]).  Please don t hesitate to call us at [phone] to reschedule any of your appointments or to speak with one of the [facility] registered nurses.  It was a pleasure taking care of you today.    Sincerely,    [facility]  [facility]  [address] [place] [address]  [address]  Phone:  [phone]

## 2024-05-09 NOTE — PROGRESS NOTES
Infusion Nursing Note:  Irma Weinberg presents today for Venofer, first dose of 3    Patient seen by provider today: No   present during visit today: Not Applicable.    Note:   Venofer infused over 90 minutes.    Intravenous Access:  Peripheral IV placed.  15 min into infusion, pt reported pain at site of PIV. Infusion paused, no blood return but flushes easily without phlebitis or edema. Brenda BAIN observed, adjusted and redressed PIV. Patient denies pain at site. Infusion resumed with redressed PIV.     Treatment Conditions:  Not Applicable.    Administrations This Visit       iron sucrose (VENOFER) 300 mg in sodium chloride 0.9 % 290 mL intermittent infusion       Admin Date  05/09/2024 Action  $New Bag Dose  300 mg Rate  193.3 mL/hr Route  Intravenous Documented By  Rosemarie Crenshaw, MARTHA                  /67 (BP Location: Left arm, Patient Position: Semi-Fish's, Cuff Size: Adult Regular)   Pulse 97   Temp 98  F (36.7  C) (Oral)   Resp 14   LMP 10/08/2023 (Exact Date)   SpO2 97%     Post Infusion Assessment:  Patient tolerated infusion without incident.  Blood return noted pre and post infusion.  Site patent and intact, free from redness, edema or discomfort.  No evidence of extravasations.  Access discontinued per protocol.       Discharge Plan:   Discharge instructions reviewed with: Patient.  Patient and/or family verbalized understanding of discharge instructions and all questions answered.  AVS to patient via SellywhereHART.  Patient will return 5/14 for next appointment.   Patient discharged in stable condition accompanied by: .  Departure Mode: Ambulatory.      Rosemarie Crenshaw RN

## 2024-05-14 ENCOUNTER — INFUSION THERAPY VISIT (OUTPATIENT)
Dept: INFUSION THERAPY | Facility: CLINIC | Age: 29
End: 2024-05-14
Attending: STUDENT IN AN ORGANIZED HEALTH CARE EDUCATION/TRAINING PROGRAM
Payer: COMMERCIAL

## 2024-05-14 VITALS
RESPIRATION RATE: 16 BRPM | TEMPERATURE: 98.3 F | DIASTOLIC BLOOD PRESSURE: 68 MMHG | HEART RATE: 81 BPM | SYSTOLIC BLOOD PRESSURE: 101 MMHG | OXYGEN SATURATION: 96 %

## 2024-05-14 DIAGNOSIS — D50.9 IRON DEFICIENCY ANEMIA DURING PREGNANCY: Primary | ICD-10-CM

## 2024-05-14 DIAGNOSIS — O99.019 IRON DEFICIENCY ANEMIA DURING PREGNANCY: Primary | ICD-10-CM

## 2024-05-14 PROCEDURE — 96365 THER/PROPH/DIAG IV INF INIT: CPT

## 2024-05-14 PROCEDURE — 258N000003 HC RX IP 258 OP 636: Performed by: STUDENT IN AN ORGANIZED HEALTH CARE EDUCATION/TRAINING PROGRAM

## 2024-05-14 PROCEDURE — 250N000011 HC RX IP 250 OP 636: Performed by: STUDENT IN AN ORGANIZED HEALTH CARE EDUCATION/TRAINING PROGRAM

## 2024-05-14 PROCEDURE — 96366 THER/PROPH/DIAG IV INF ADDON: CPT

## 2024-05-14 RX ORDER — ALBUTEROL SULFATE 0.83 MG/ML
2.5 SOLUTION RESPIRATORY (INHALATION)
Status: CANCELLED | OUTPATIENT
Start: 2024-05-15

## 2024-05-14 RX ORDER — MEPERIDINE HYDROCHLORIDE 25 MG/ML
25 INJECTION INTRAMUSCULAR; INTRAVENOUS; SUBCUTANEOUS EVERY 30 MIN PRN
Status: CANCELLED | OUTPATIENT
Start: 2024-05-15

## 2024-05-14 RX ORDER — HEPARIN SODIUM,PORCINE 10 UNIT/ML
5-20 VIAL (ML) INTRAVENOUS DAILY PRN
Status: CANCELLED | OUTPATIENT
Start: 2024-05-15

## 2024-05-14 RX ORDER — EPINEPHRINE 1 MG/ML
0.3 INJECTION, SOLUTION INTRAMUSCULAR; SUBCUTANEOUS EVERY 5 MIN PRN
Status: CANCELLED | OUTPATIENT
Start: 2024-05-15

## 2024-05-14 RX ORDER — HEPARIN SODIUM (PORCINE) LOCK FLUSH IV SOLN 100 UNIT/ML 100 UNIT/ML
5 SOLUTION INTRAVENOUS
Status: CANCELLED | OUTPATIENT
Start: 2024-05-15

## 2024-05-14 RX ORDER — ALBUTEROL SULFATE 90 UG/1
1-2 AEROSOL, METERED RESPIRATORY (INHALATION)
Status: CANCELLED
Start: 2024-05-15

## 2024-05-14 RX ORDER — DIPHENHYDRAMINE HYDROCHLORIDE 50 MG/ML
50 INJECTION INTRAMUSCULAR; INTRAVENOUS
Status: CANCELLED
Start: 2024-05-15

## 2024-05-14 RX ORDER — METHYLPREDNISOLONE SODIUM SUCCINATE 125 MG/2ML
125 INJECTION, POWDER, LYOPHILIZED, FOR SOLUTION INTRAMUSCULAR; INTRAVENOUS
Status: CANCELLED
Start: 2024-05-15

## 2024-05-14 RX ADMIN — IRON SUCROSE 300 MG: 20 INJECTION, SOLUTION INTRAVENOUS at 15:25

## 2024-05-14 NOTE — PROGRESS NOTES
Infusion Nursing Note:  Irma Weinberg presents today for Patient presents with:  Infusion: Venofer      Patient seen by provider today: No   present during visit today: No    Note: During [facility]'s [facility] appointment, orders from Ana Ojeda MD  were completed.  Patient identification verified by name and date of birth.  Assessment completed.  Vitals recorded in Doc Flowsheets.  Patient was provided with education regarding medication/procedure and possible side effects.  Patient verbalized understanding.    Frequency: every 2 days for three doses, today is 2/3  Labs: none  Premedications:none  Infusion Rate/Length: Venofer  infused at *193 mL/hr. Total infusion time of approximately 90         Intravenous Access:  Labs drawn without difficulty.  Peripheral IV placed.    Treatment Conditions:  Not Applicable.      Post Infusion Assessment:  Patient tolerated infusion without incident.  Site patent and intact, free from redness, edema or discomfort.  No evidence of extravasations.  Access discontinued per protocol.       Discharge Plan:   Discharge instructions reviewed with: Patient.  AVS to patient via STAT-DiagnosticaT.  Patient will return   Future Appointments   Date Time Provider Department Center   5/17/2024 12:00 PM [facility] INFUSION NURSE ANGELINE [facility]   5/22/2024 11:15 AM Emily Beyer MD [facility] CLIN   5/23/2024 11:45 AM KISHA [facility]   5/23/2024 12:15 PM DANTE FLORIAN MD [facility]   6/5/2024 11:00 AM Emily Beyer MD [facility]   6/19/2024 11:00 AM Emily Beyer MD [facility]   6/25/2024 11:00 AM Cristina Burroughs MD [facility] CLIN      for next appointment.   Patient discharged in stable condition accompanied by: self.  Departure Mode: Ambulatory    /70 (BP Location: Right arm, Patient Position: Sitting, Cuff Size: Adult Regular)   Pulse 91   Temp 98.3  F (36.8  C) (Oral)   Resp 16    LMP 10/08/2023 (Exact Date)   SpO2 96%   Jonna Durham RN on 5/14/2024 at 3:00 PM  .

## 2024-05-14 NOTE — PATIENT INSTRUCTIONS
Dear Irma Weinberg    Thank you for choosing [facility] ([facility]) for your infusion.  The following information is a summary of our appointment as well as important reminders.        If you are a transplant patient and require transplant education, please click on this link: [email or web address].    We look forward in seeing you on your next appointment here at [facility] ([facility]).  Please don t hesitate to call us at [phone] to reschedule any of your appointments or to speak with one of the [facility] registered nurses.  It was a pleasure taking care of you today.    Sincerely,    [facility]  [facility]  [address] [place] [address]  [address]  Phone:  [phone]

## 2024-05-17 ENCOUNTER — INFUSION THERAPY VISIT (OUTPATIENT)
Dept: INFUSION THERAPY | Facility: CLINIC | Age: 29
End: 2024-05-17
Attending: STUDENT IN AN ORGANIZED HEALTH CARE EDUCATION/TRAINING PROGRAM
Payer: COMMERCIAL

## 2024-05-17 VITALS
DIASTOLIC BLOOD PRESSURE: 69 MMHG | OXYGEN SATURATION: 96 % | RESPIRATION RATE: 16 BRPM | SYSTOLIC BLOOD PRESSURE: 106 MMHG | TEMPERATURE: 97.9 F | HEART RATE: 80 BPM

## 2024-05-17 DIAGNOSIS — D50.9 IRON DEFICIENCY ANEMIA DURING PREGNANCY: Primary | ICD-10-CM

## 2024-05-17 DIAGNOSIS — O99.019 IRON DEFICIENCY ANEMIA DURING PREGNANCY: Primary | ICD-10-CM

## 2024-05-17 PROCEDURE — 250N000011 HC RX IP 250 OP 636: Performed by: STUDENT IN AN ORGANIZED HEALTH CARE EDUCATION/TRAINING PROGRAM

## 2024-05-17 PROCEDURE — 258N000003 HC RX IP 258 OP 636: Performed by: STUDENT IN AN ORGANIZED HEALTH CARE EDUCATION/TRAINING PROGRAM

## 2024-05-17 PROCEDURE — 96365 THER/PROPH/DIAG IV INF INIT: CPT

## 2024-05-17 PROCEDURE — 96366 THER/PROPH/DIAG IV INF ADDON: CPT

## 2024-05-17 RX ORDER — ALBUTEROL SULFATE 0.83 MG/ML
2.5 SOLUTION RESPIRATORY (INHALATION)
OUTPATIENT
Start: 2024-05-18

## 2024-05-17 RX ORDER — DIPHENHYDRAMINE HYDROCHLORIDE 50 MG/ML
50 INJECTION INTRAMUSCULAR; INTRAVENOUS
Start: 2024-05-18

## 2024-05-17 RX ORDER — FAMOTIDINE 20 MG/1
20 TABLET, FILM COATED ORAL DAILY
COMMUNITY
End: 2024-08-08

## 2024-05-17 RX ORDER — METHYLPREDNISOLONE SODIUM SUCCINATE 125 MG/2ML
125 INJECTION, POWDER, LYOPHILIZED, FOR SOLUTION INTRAMUSCULAR; INTRAVENOUS
Start: 2024-05-18

## 2024-05-17 RX ORDER — EPINEPHRINE 1 MG/ML
0.3 INJECTION, SOLUTION INTRAMUSCULAR; SUBCUTANEOUS EVERY 5 MIN PRN
OUTPATIENT
Start: 2024-05-18

## 2024-05-17 RX ORDER — HEPARIN SODIUM (PORCINE) LOCK FLUSH IV SOLN 100 UNIT/ML 100 UNIT/ML
5 SOLUTION INTRAVENOUS
OUTPATIENT
Start: 2024-05-18

## 2024-05-17 RX ORDER — MEPERIDINE HYDROCHLORIDE 25 MG/ML
25 INJECTION INTRAMUSCULAR; INTRAVENOUS; SUBCUTANEOUS EVERY 30 MIN PRN
OUTPATIENT
Start: 2024-05-18

## 2024-05-17 RX ORDER — ALBUTEROL SULFATE 90 UG/1
1-2 AEROSOL, METERED RESPIRATORY (INHALATION)
Start: 2024-05-18

## 2024-05-17 RX ORDER — HEPARIN SODIUM,PORCINE 10 UNIT/ML
5-20 VIAL (ML) INTRAVENOUS DAILY PRN
OUTPATIENT
Start: 2024-05-18

## 2024-05-17 RX ADMIN — SODIUM CHLORIDE 250 ML: 9 INJECTION, SOLUTION INTRAVENOUS at 13:01

## 2024-05-17 RX ADMIN — IRON SUCROSE 300 MG: 20 INJECTION, SOLUTION INTRAVENOUS at 12:09

## 2024-05-17 ASSESSMENT — PAIN SCALES - GENERAL: PAINLEVEL: NO PAIN (0)

## 2024-05-17 NOTE — PROGRESS NOTES
Infusion Nursing Note:  Irma Weinberg presents today for Venofer infusion #3 of 3.    Patient seen by provider today: No   present during visit today: Not Applicable.    Note: Venofer infused over 90 mins.    PIV was placed in left AC, blood return confirmed, pt states no pain. When attempting to start Venofer infusion, IV pump immediately alarmed distal occlusion. PIV can be flushed but unable to confirm blood return. Pt states it is burning slightly, with swelling noted near insertion site. Left AC PIV was D/C'd, and a new PIV was placed in right forearm, blood return confirmed. Pt denies pain with Venofer infusing. RN checked with pt after 15 mins, pt reports mild intermittent burning pain at IV site. No redness or swelling noted, IV flushed without difficulty. Heat pack was applied which pt states is helpful. NS was started as concomitant fluid to help with pain. Pt tolerated the remainder of infusion without complaint.    Intravenous Access:  Peripheral IV placed.    Treatment Conditions:  Not Applicable.    /74 (BP Location: Left arm, Patient Position: Semi-Fish's, Cuff Size: Adult Regular)   Pulse 92   Temp 97.9  F (36.6  C) (Oral)   Resp 16   LMP 10/08/2023 (Exact Date)   SpO2 96%     Administrations This Visit       iron sucrose (VENOFER) 300 mg in sodium chloride 0.9 % 290 mL intermittent infusion       Admin Date  05/17/2024 Action  $New Bag Dose  300 mg Rate  193.3 mL/hr Route  Intravenous Documented By  Shruthi Peraza RN              sodium chloride 0.9% BOLUS 250 mL       Admin Date  05/17/2024 Action  $New Bag Dose  250 mL Route  Intravenous Documented By  Shruthi Peraza RN                  Post Infusion Assessment:  Patient tolerated infusion without incident.  Site patent and intact, free from redness, edema or discomfort.  No evidence of extravasations.  Access discontinued per protocol.     Discharge Plan:   AVS to patient via MYCHART.  Regimen complete, [facility].  Patient discharged in stable condition accompanied by: .  Departure Mode: Ambulatory.    Shruthi Peraza RN

## 2024-05-17 NOTE — PATIENT INSTRUCTIONS
Dear Irma Weinberg    Thank you for choosing [facility] ([facility]) for your infusion.  The following information is a summary of our appointment as well as important reminders.      We look forward in seeing you on your next appointment here at [facility] ([facility]).  Please don t hesitate to call us at [phone] to reschedule any of your appointments or to speak with one of the [facility] registered nurses.  It was a pleasure taking care of you today.    Sincerely,    [facility]  [facility]  [address] [place] [address]  [address]  Phone:  [phone]

## 2024-05-21 ENCOUNTER — TELEPHONE (OUTPATIENT)
Dept: OBGYN | Facility: CLINIC | Age: 29
End: 2024-05-21
Payer: COMMERCIAL

## 2024-05-21 NOTE — PATIENT INSTRUCTIONS
"Weeks 32 to 34 of Your Pregnancy: Care Instructions    Decide whether you want to bank or donate your baby's umbilical cord blood. If you want to save this blood, you have to arrange for it ahead of time.    Decide about circumcision. Personal, Mandaen, or cultural beliefs may play a role in your decision. You get to decide what you want for your baby.    Learn how to ease hemorrhoids.    Get more liquids, fruits, vegetables, and fiber in your diet.  Avoid sitting for too long.  Clean yourself with moist toilet paper. Or try witch hazel pads.  Try ice packs or warm sitz baths for discomfort.  Use hydrocortisone cream for pain or itching.  Ask your doctor about stool softeners.    Consider the benefits of breastfeeding.    It reduces your baby's risk of sudden infant death syndrome (SIDS).   babies are less likely to get certain infections. And they're less likely to be obese or get diabetes later in life.  It can lower your risk of breast and ovarian cancers and osteoporosis.  It saves you money.  Follow-up care is a key part of your treatment and safety. Be sure to make and go to all appointments, and call your doctor if you are having problems. It's also a good idea to know your test results and keep a list of the medicines you take.  Where can you learn more?  Go to https://www.ZettaCore.net/patiented  Enter X711 in the search box to learn more about \"Weeks 32 to 34 of Your Pregnancy: Care Instructions.\"  Current as of: July 10, 2023               Content Version: 14.0    9073-1509 [employer].   Care instructions adapted under license by your healthcare professional. If you have questions about a medical condition or this instruction, always ask your healthcare professional. [employer] disclaims any warranty or liability for your use of this information.      You have been provided the Any Day Now: Early Labor at Home document.    Additional copies can be found here:  " [email or web address]  You have been provided the What I'd Wish I'd Known About Giving Birth document.    Additional copies can be found here:  [email or web address]    Thank you for trusting us with your care!     If you need to contact us for questions about:  Symptoms, Scheduling & Medical Questions; Non-urgent (2-3 day response) DoutÃ­ssima message, Urgent (needing response today) [phone] (if after 3:30pm next day response)   Prescriptions: Please call your Pharmacy   Billing: Kari [phone] or  Physicians:[phone]

## 2024-05-21 NOTE — TELEPHONE ENCOUNTER
Received orders for sacroiliac support, compression stockings, and breast pump    Placed in Dr. Beyer's mailbox

## 2024-05-22 ENCOUNTER — PRENATAL OFFICE VISIT (OUTPATIENT)
Dept: OBGYN | Facility: CLINIC | Age: 29
End: 2024-05-22
Attending: OBSTETRICS & GYNECOLOGY
Payer: COMMERCIAL

## 2024-05-22 ENCOUNTER — LAB (OUTPATIENT)
Dept: LAB | Facility: CLINIC | Age: 29
End: 2024-05-22
Attending: OBSTETRICS & GYNECOLOGY
Payer: COMMERCIAL

## 2024-05-22 VITALS
WEIGHT: 176.1 LBS | DIASTOLIC BLOOD PRESSURE: 62 MMHG | BODY MASS INDEX: 32.41 KG/M2 | HEART RATE: 80 BPM | SYSTOLIC BLOOD PRESSURE: 96 MMHG | HEIGHT: 62 IN

## 2024-05-22 DIAGNOSIS — D50.9 IRON DEFICIENCY ANEMIA DURING PREGNANCY: ICD-10-CM

## 2024-05-22 DIAGNOSIS — O99.019 IRON DEFICIENCY ANEMIA DURING PREGNANCY: ICD-10-CM

## 2024-05-22 DIAGNOSIS — Z34.90 NORMAL INTRAUTERINE PREGNANCY, ANTEPARTUM: Primary | ICD-10-CM

## 2024-05-22 LAB
ERYTHROCYTE [DISTWIDTH] IN BLOOD BY AUTOMATED COUNT: 14.3 % (ref 10–15)
HCT VFR BLD AUTO: 36.1 % (ref 35–47)
HGB BLD-MCNC: 11.8 G/DL (ref 11.7–15.7)
MCH RBC QN AUTO: 29.6 PG (ref 26.5–33)
MCHC RBC AUTO-ENTMCNC: 32.7 G/DL (ref 31.5–36.5)
MCV RBC AUTO: 91 FL (ref 78–100)
PLATELET # BLD AUTO: 245 10E3/UL (ref 150–450)
RBC # BLD AUTO: 3.98 10E6/UL (ref 3.8–5.2)
WBC # BLD AUTO: 8.8 10E3/UL (ref 4–11)

## 2024-05-22 PROCEDURE — 36415 COLL VENOUS BLD VENIPUNCTURE: CPT

## 2024-05-22 PROCEDURE — 99213 OFFICE O/P EST LOW 20 MIN: CPT | Performed by: OBSTETRICS & GYNECOLOGY

## 2024-05-22 PROCEDURE — 85027 COMPLETE CBC AUTOMATED: CPT

## 2024-05-22 PROCEDURE — 99207 PR PRENATAL VISIT: CPT | Performed by: OBSTETRICS & GYNECOLOGY

## 2024-05-22 NOTE — PROGRESS NOTES
27 yo  at 32+3 here for CHUCK. Doing well.   Preg complicated by:  Patient Active Problem List   Diagnosis    Nonimmune to hepatitis B virus        Iron deficiency anemia during pregnancy     Requests repeat CBC now s/p 3 IV iron infusions.     See flowsheet    A/P: routine PNC, RTC as scheduled.     Emily Beyer MD

## 2024-05-23 ENCOUNTER — OFFICE VISIT (OUTPATIENT)
Dept: MATERNAL FETAL MEDICINE | Facility: CLINIC | Age: 29
End: 2024-05-23
Attending: OBSTETRICS & GYNECOLOGY
Payer: COMMERCIAL

## 2024-05-23 ENCOUNTER — HOSPITAL ENCOUNTER (OUTPATIENT)
Dept: ULTRASOUND IMAGING | Facility: CLINIC | Age: 29
Discharge: HOME | End: 2024-05-23
Attending: OBSTETRICS & GYNECOLOGY
Payer: COMMERCIAL

## 2024-05-23 DIAGNOSIS — O35.EXX0 ENCOUNTER FOR REPEAT ULTRASOUND OF FETAL PYELECTASIS, ANTEPARTUM, SINGLE OR UNSPECIFIED FETUS: ICD-10-CM

## 2024-05-23 DIAGNOSIS — O35.EXX0 ENCOUNTER FOR REPEAT ULTRASOUND OF FETAL PYELECTASIS, ANTEPARTUM, SINGLE OR UNSPECIFIED FETUS: Primary | ICD-10-CM

## 2024-05-23 PROCEDURE — 76816 OB US FOLLOW-UP PER FETUS: CPT | Mod: 26 | Performed by: OBSTETRICS & GYNECOLOGY

## 2024-05-23 PROCEDURE — 76816 OB US FOLLOW-UP PER FETUS: CPT

## 2024-05-23 NOTE — NURSING NOTE
Patient here for follow ultrasound today for growth and to reevaluate the kidneys. Patient reports positive fetal movement, denies contractions, leaking of fluid, or bleeding.  Reports starting on Prilosec and having Iron infusions since last. Education provided to patient on today's ultrasound.  SBAR given to CHIQUI BAKER, see their note in Epic.

## 2024-05-23 NOTE — PROGRESS NOTES
"Please see \"Imaging\" tab under \"Chart Review\" for details of today's US at the [facility].    Jose Luis Milligan MD  Maternal-Fetal Medicine    "

## 2024-06-04 NOTE — PATIENT INSTRUCTIONS
Thank you for trusting us with your care!     If you need to contact us for questions about:  Symptoms, Scheduling & Medical Questions; Non-urgent (2-3 day response) Primordial message, Urgent (needing response today) [phone] (if after 3:30pm next day response)   Prescriptions: Please call your Pharmacy   Billing: Kari [phone] or [facility] Physicians:[phone]    Weeks 34 to 36 of Your Pregnancy: Care Instructions  Your belly has grown quite large. It's almost time to give birth! Your baby's lungs are almost ready to breathe air. The skull bones are firm enough to protect your baby's head. But they're soft enough to move down through the birth canal.    You might be wondering what to expect during labor. Because each birth is different, there's no way to know exactly what childbirth will be like for you. Talk to your doctor or midwife about any concerns you have.    You'll probably have a test for group B streptococcus (GBS). GBS is bacteria that can live in the vagina and rectum. GBS can make your baby sick after birth. If you test positive, you'll get antibiotics during labor.    Choose what type of pain relief you would like during labor.  You can choose from a few types, including medicine and non-medicine options. You may want to use several types of pain relief.     Know how medicines can help with pain during labor.  Some medicines lower anxiety and help with some of the pain. Others make your lower body numb so that you will feel less pain.     Tell your doctor about your pain medicine choice.  Do this before you start labor or very early in your labor. You may be able to change your mind during labor.     Learn about the stages of labor.    The first stage includes the early (latent) and active phases of labor. Contractions start in early labor. During active labor, contractions get stronger, last longer, and happen more often. And the cervix opens more rapidly.  The second stage starts when you're  "ready to push. During this stage, your baby is born.  During the third stage, you'll usually have a few more contractions to push out the placenta.   Follow-up care is a key part of your treatment and safety. Be sure to make and go to all appointments, and call your doctor if you are having problems. It's also a good idea to know your test results and keep a list of the medicines you take.  Where can you learn more?  Go to https://www.Indigio.net/patiented  Enter B912 in the search box to learn more about \"Weeks 34 to 36 of Your Pregnancy: Care Instructions.\"  Current as of: July 10, 2023               Content Version: 14.0    0953-1289 [email or web address].   Care instructions adapted under license by your healthcare professional. If you have questions about a medical condition or this instruction, always ask your healthcare professional. [email or web address] disclaims any warranty or liability for your use of this information.      Group B Strep During Pregnancy: Care Instructions  Overview     Group B strep infection is caused by a type of bacteria. It's a different kind of bacteria than the kind that causes strep throat.  You may have this kind of bacteria in your body. Sometimes it may cause an infection, but most of the time it doesn't make you sick or cause symptoms. But if you pass the bacteria to your baby during the birth, it can cause serious health problems for your baby.  If you have this bacteria in your body, you will get antibiotics when you are in labor. Antibiotics help prevent problems for a  baby.  After birth, doctors will watch and may test your baby. If your baby tests positive for Group B strep, your baby will get antibiotics.  If you plan to breastfeed your baby, don't worry. It will be safe to breastfeed.  Follow-up care is a key part of your treatment and safety. Be sure to make and go to all appointments, and call your doctor if you are having problems. It's also a good " "idea to know your test results and keep a list of the medicines you take.  How can you care for yourself at home?  If your doctor has prescribed antibiotics, take them as directed. Do not stop taking them just because you feel better. You need to take the full course of antibiotics.  Tell your doctor if you are allergic to any antibiotic.  If you go into labor, or your water breaks, go to the hospital. Your doctor will give you antibiotics to help protect your baby from infection.  Tell the doctors and nurses if you have an allergy to penicillin.  Tell the doctors and nurses at the hospital that you tested positive for group B strep.  When should you call for help?   Call your doctor now or seek immediate medical care if:    You have symptoms of a urinary tract infection. These may include:  Pain or burning when you urinate.  A frequent need to urinate without being able to pass much urine.  Pain in the flank, which is just below the rib cage and above the waist on either side of the back.  Blood in your urine.  A fever.     You think you are in labor or your water has broken.     You have pain in your belly or pelvis.   Watch closely for changes in your health, and be sure to contact your doctor if you have any problems.  Where can you learn more?  Go to https://www.Surfly.net/patiented  Enter M001 in the search box to learn more about \"Group B Strep During Pregnancy: Care Instructions.\"  Current as of: June 12, 2023               Content Version: 14.0    0859-8722 [employer].   Care instructions adapted under license by your healthcare professional. If you have questions about a medical condition or this instruction, always ask your healthcare professional. [employer] disclaims any warranty or liability for your use of this information.      Circumcision in Infants: What to Expect at Home  Your Child's Recovery  After circumcision, your baby's penis may look red and swollen. It may " have petroleum jelly and gauze on it. The gauze will likely come off when your baby urinates. Follow your doctor's directions about whether to put clean gauze back on your baby's penis or to leave the gauze off. If you need to remove gauze from the penis, use warm water to soak the gauze and gently loosen it.  The doctor may have used a Plastibell device to do the circumcision. If so, your baby will have a plastic ring around the head of the penis. The ring should fall off by itself in 10 to 12 days.  A thin, yellow film may form over the area the day after the procedure. This is part of the normal healing process. It should go away in a few days.  Your baby may seem fussy while the area heals. It may hurt for your baby to urinate. This pain often gets better in 3 or 4 days. But it may last for up to 2 weeks.  Even though your baby's penis will likely start to feel better after 3 or 4 days, it may look worse. The penis often starts to look like it's getting better after about 7 to 10 days.  This care sheet gives you a general idea about how long it will take for your child to recover. But each child recovers at a different pace. Follow the steps below to help your child get better as quickly as possible.  How can you care for your child at home?  Activity    Let your baby rest as much as possible. Sleeping will help with recovery.     You can give your baby a sponge bath the day after surgery. Ask your doctor when it is okay to give your baby a bath.   Medicines    Your doctor will tell you if and when your child can restart any medicines. The doctor will also give you instructions about your child taking any new medicines.     Your doctor may recommend giving your baby acetaminophen (Tylenol) to help with pain after the procedure. Be safe with medicines. Give your child medicines exactly as prescribed. Call your doctor if you think your child is having a problem with a medicine.     Do not give your child two or  more pain medicines at the same time unless the doctor told you to. Many pain medicines have acetaminophen, which is Tylenol. Too much acetaminophen (Tylenol) can be harmful.   Circumcision care    Always wash your hands before and after touching the circumcision area.     Gently wash your baby's penis with plain, warm water after each diaper change, and pat it dry. Do not use soap. Don't use hydrogen peroxide or alcohol. They can slow healing.     Do not try to remove the film that forms on the penis. The film will go away on its own.     Put plenty of petroleum jelly (such as Vaseline) on the circumcision area during each diaper change. This will prevent your baby's penis from sticking to the diaper while it heals.     Fasten your baby's diapers loosely so that there is less pressure on the penis while it heals.   Follow-up care is a key part of your child's treatment and safety. Be sure to make and go to all appointments, and call your doctor if your child is having problems. It's also a good idea to know your child's test results and keep a list of the medicines your child takes.  When should you call for help?   Call your doctor now or seek immediate medical care if:    Your baby has a fever over 100.4 F.     Your baby is extremely fussy or irritable, has a high-pitched cry, or refuses to eat.     Your baby does not have a wet diaper within 12 hours after the circumcision.     You find a spot of bleeding larger than a 2-inch [place] from the incision.     Your baby has signs of infection. Signs may include severe swelling; redness; a red streak on the shaft of the penis; or a thick, yellow discharge.   Watch closely for changes in your child's health, and be sure to contact your doctor if:    A Plastibell device was used for the circumcision and the ring has not fallen off after 10 to 12 days.   Where can you learn more?  Go to https://www.healthwise.net/patiented  Enter S278 in the search box to learn more  "about \"Circumcision in Infants: What to Expect at Home.\"  Current as of: October 24, 2023               Content Version: 14.0    8925-2688 [employer].   Care instructions adapted under license by your healthcare professional. If you have questions about a medical condition or this instruction, always ask your healthcare professional. [employer] disclaims any warranty or liability for your use of this information.      "

## 2024-06-05 ENCOUNTER — PRENATAL OFFICE VISIT (OUTPATIENT)
Dept: OBGYN | Facility: CLINIC | Age: 29
End: 2024-06-05
Attending: OBSTETRICS & GYNECOLOGY
Payer: COMMERCIAL

## 2024-06-05 VITALS
SYSTOLIC BLOOD PRESSURE: 110 MMHG | HEART RATE: 84 BPM | BODY MASS INDEX: 32.94 KG/M2 | WEIGHT: 179 LBS | HEIGHT: 62 IN | DIASTOLIC BLOOD PRESSURE: 75 MMHG

## 2024-06-05 DIAGNOSIS — Z34.90 NORMAL INTRAUTERINE PREGNANCY, ANTEPARTUM: Primary | ICD-10-CM

## 2024-06-05 DIAGNOSIS — D50.9 IRON DEFICIENCY ANEMIA, UNSPECIFIED IRON DEFICIENCY ANEMIA TYPE: ICD-10-CM

## 2024-06-05 PROCEDURE — 99207 PR PRENATAL VISIT: CPT | Performed by: OBSTETRICS & GYNECOLOGY

## 2024-06-05 PROCEDURE — 99213 OFFICE O/P EST LOW 20 MIN: CPT | Performed by: OBSTETRICS & GYNECOLOGY

## 2024-06-05 NOTE — PROGRESS NOTES
29 yo  at 34+3 here for CHUCK. Doing well, no issues. 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

## 2024-06-13 ENCOUNTER — TELEPHONE (OUTPATIENT)
Dept: OBGYN | Facility: CLINIC | Age: 29
End: 2024-06-13
Payer: COMMERCIAL

## 2024-06-18 NOTE — PATIENT INSTRUCTIONS
"Week 37 of Your Pregnancy: Care Instructions    Most babies are born between 37 and 40 weeks.    This is a good time to pack a bag to take with you to the birth. Then it will be ready to go when you are.    Learn about breastfeeding.  For example, find out about ways to hold your baby to make breastfeeding easier. And think about learning how to pump and store milk.     Know that crying is normal.  It's common for babies to cry 1 to 3 hours a day. Some cry more, and some cry less.     Learn why babies cry.  They may be hungry; have gas; need a diaper change; or feel cold, warm, tired, lonely, or tense. Sometimes they cry for unknown reasons.     Think about what will help you stay calm when your baby cries.  Taking slow, deep breaths can help. So can taking a break. It's okay to put your baby somewhere safe (like their crib) and walk away for a few minutes.     Learn about safe sleep for your baby.  Always put your baby to sleep on their back. Place them alone in a crib or bassinet with a firm, flat surface. Keep soft items like stuffed animals out of the crib.     Learn what to expect with  poop.  Your baby will have their own bowel patterns. Some babies have several bowel movements a day. Some have fewer.     Know that  babies will often have loose, yellow bowel movements.  Formula-fed babies have more formed stools. If your baby's poop looks like pellets, your baby is constipated.   Follow-up care is a key part of your treatment and safety. Be sure to make and go to all appointments, and call your doctor if you are having problems. It's also a good idea to know your test results and keep a list of the medicines you take.  Where can you learn more?  Go to https://www.Perfect Pizza.net/patiented  Enter N257 in the search box to learn more about \"Week 37 of Your Pregnancy: Care Instructions.\"  Current as of: July 10, 2023               Content Version: 14.0    4519-0530 [employer].   Care " instructions adapted under license by your healthcare professional. If you have questions about a medical condition or this instruction, always ask your healthcare professional. [employer] disclaims any warranty or liability for your use of this information.

## 2024-06-19 ENCOUNTER — PRENATAL OFFICE VISIT (OUTPATIENT)
Dept: OBGYN | Facility: CLINIC | Age: 29
End: 2024-06-19
Attending: OBSTETRICS & GYNECOLOGY
Payer: COMMERCIAL

## 2024-06-19 ENCOUNTER — MYC MEDICAL ADVICE (OUTPATIENT)
Dept: OBGYN | Facility: CLINIC | Age: 29
End: 2024-06-19

## 2024-06-19 VITALS
HEART RATE: 62 BPM | WEIGHT: 180 LBS | BODY MASS INDEX: 32.7 KG/M2 | SYSTOLIC BLOOD PRESSURE: 105 MMHG | DIASTOLIC BLOOD PRESSURE: 71 MMHG

## 2024-06-19 DIAGNOSIS — Z34.90 NORMAL INTRAUTERINE PREGNANCY, ANTEPARTUM: ICD-10-CM

## 2024-06-19 DIAGNOSIS — K21.9 GASTROESOPHAGEAL REFLUX DISEASE WITHOUT ESOPHAGITIS: Primary | ICD-10-CM

## 2024-06-19 PROCEDURE — 87653 STREP B DNA AMP PROBE: CPT | Performed by: OBSTETRICS & GYNECOLOGY

## 2024-06-19 PROCEDURE — 99207 PR PRENATAL VISIT: CPT | Performed by: OBSTETRICS & GYNECOLOGY

## 2024-06-19 PROCEDURE — 99213 OFFICE O/P EST LOW 20 MIN: CPT | Performed by: OBSTETRICS & GYNECOLOGY

## 2024-06-19 ASSESSMENT — PAIN SCALES - GENERAL: PAINLEVEL: NO PAIN (0)

## 2024-06-19 ASSESSMENT — PATIENT HEALTH QUESTIONNAIRE - PHQ9: SUM OF ALL RESPONSES TO PHQ QUESTIONS 1-9: 0

## 2024-06-19 NOTE — PROGRESS NOTES
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 We discussed when he should use 911 instead prn emergencies  She desires elective IOL on -- will arrange 10am    RTC one week  GBS sent  Emily Beyer MD

## 2024-06-20 LAB — GP B STREP DNA SPEC QL NAA+PROBE: NEGATIVE

## 2024-06-25 ENCOUNTER — PRENATAL OFFICE VISIT (OUTPATIENT)
Dept: OBGYN | Facility: CLINIC | Age: 29
End: 2024-06-25
Attending: OBSTETRICS & GYNECOLOGY
Payer: COMMERCIAL

## 2024-06-25 VITALS
WEIGHT: 181.8 LBS | HEART RATE: 86 BPM | HEIGHT: 62 IN | BODY MASS INDEX: 33.45 KG/M2 | DIASTOLIC BLOOD PRESSURE: 66 MMHG | SYSTOLIC BLOOD PRESSURE: 100 MMHG

## 2024-06-25 DIAGNOSIS — Z34.90 NORMAL INTRAUTERINE PREGNANCY, ANTEPARTUM: Primary | ICD-10-CM

## 2024-06-25 DIAGNOSIS — K21.9 GASTROESOPHAGEAL REFLUX DISEASE WITHOUT ESOPHAGITIS: ICD-10-CM

## 2024-06-25 DIAGNOSIS — R35.0 URINARY FREQUENCY: ICD-10-CM

## 2024-06-25 LAB
ALBUMIN UR-MCNC: 10 MG/DL
APPEARANCE UR: ABNORMAL
BACTERIA #/AREA URNS HPF: ABNORMAL /HPF
BILIRUB UR QL STRIP: NEGATIVE
COLOR UR AUTO: ABNORMAL
GLUCOSE UR STRIP-MCNC: NEGATIVE MG/DL
HGB UR QL STRIP: NEGATIVE
KETONES UR STRIP-MCNC: NEGATIVE MG/DL
LEUKOCYTE ESTERASE UR QL STRIP: ABNORMAL
MUCOUS THREADS #/AREA URNS LPF: PRESENT /LPF
NITRATE UR QL: NEGATIVE
PH UR STRIP: 6.5 [PH] (ref 5–7)
RBC URINE: 3 /HPF
SP GR UR STRIP: 1.01 (ref 1–1.03)
SQUAMOUS EPITHELIAL: 43 /HPF
TRANSITIONAL EPI: 1 /HPF
UROBILINOGEN UR STRIP-MCNC: NORMAL MG/DL
WBC URINE: 6 /HPF

## 2024-06-25 PROCEDURE — 99207 PR PRENATAL VISIT: CPT | Performed by: OBSTETRICS & GYNECOLOGY

## 2024-06-25 PROCEDURE — 81001 URINALYSIS AUTO W/SCOPE: CPT | Performed by: OBSTETRICS & GYNECOLOGY

## 2024-06-25 PROCEDURE — 99213 OFFICE O/P EST LOW 20 MIN: CPT | Performed by: OBSTETRICS & GYNECOLOGY

## 2024-06-25 NOTE — PATIENT INSTRUCTIONS
"Week 37 of Your Pregnancy: Care Instructions    Most babies are born between 37 and 40 weeks.    This is a good time to pack a bag to take with you to the birth. Then it will be ready to go when you are.    Learn about breastfeeding.  For example, find out about ways to hold your baby to make breastfeeding easier. And think about learning how to pump and store milk.     Know that crying is normal.  It's common for babies to cry 1 to 3 hours a day. Some cry more, and some cry less.     Learn why babies cry.  They may be hungry; have gas; need a diaper change; or feel cold, warm, tired, lonely, or tense. Sometimes they cry for unknown reasons.     Think about what will help you stay calm when your baby cries.  Taking slow, deep breaths can help. So can taking a break. It's okay to put your baby somewhere safe (like their crib) and walk away for a few minutes.     Learn about safe sleep for your baby.  Always put your baby to sleep on their back. Place them alone in a crib or bassinet with a firm, flat surface. Keep soft items like stuffed animals out of the crib.     Learn what to expect with  poop.  Your baby will have their own bowel patterns. Some babies have several bowel movements a day. Some have fewer.     Know that  babies will often have loose, yellow bowel movements.  Formula-fed babies have more formed stools. If your baby's poop looks like pellets, your baby is constipated.   Follow-up care is a key part of your treatment and safety. Be sure to make and go to all appointments, and call your doctor if you are having problems. It's also a good idea to know your test results and keep a list of the medicines you take.  Where can you learn more?  Go to https://www.Worldrat.net/patiented  Enter N257 in the search box to learn more about \"Week 37 of Your Pregnancy: Care Instructions.\"  Current as of: July 10, 2023               Content Version: 14.0    4000-2176 [employer].   Care " instructions adapted under license by your healthcare professional. If you have questions about a medical condition or this instruction, always ask your healthcare professional. [employer] disclaims any warranty or liability for your use of this information.

## 2024-06-25 NOTE — PROGRESS NOTES
"Subjective:     Irma is a 28 year old  at 37w2d with a past medical history of iron deficiency anemia and GERD who presents for a routine prenatal appointment.    She does not endorse any vaginal bleeding, leakage of fluid, or change in vaginal discharge. No contractions. She endorses positive fetal movement. No HA, visual changes, RUQ pain.     Patient concerns: Feeling well overall.   - Experiences increased urination frequency that started around week 36. States that she voids bladder every 5-10 minutes.   - Experiences heartburn, controlled with Pepcid.   - Experiences general abdominal pain, described as stretching/tension.     Objective:  Vitals:    24 1054   BP: 100/66   Pulse: 86   Weight: 82.5 kg (181 lb 12.8 oz)   Height: 1.58 m (5' 2.21\")    See OB flowsheet    Assessment/Plan     Encounter Diagnoses   Name Primary?     Yes    Gastroesophageal reflux disease without esophagitis     Urinary frequency      Orders Placed This Encounter   Procedures    Routine UA with micro reflex to culture           2023    12:49 PM 2024    10:50 AM   PHQ-9 SCORE   PHQ-9 Total Score MyChart 10 (Moderate depression)    PHQ-9 Total Score 10 0       GBS screening: neg last visit    - Obtained routine UA for increased urination frequency.  - Birth preferences and pain management options during delivery discussed.   - Rescheduled elective induction to 7/10.   - Labor signs discussed. Reinforced daily fetal movement counts.  - Reviewed why/how to contact provider if headache/visual changes/RUQ or epigastric pain, decreased fetal movement, vaginal bleeding, leakage of fluid.     Return to clinic in 1 week and prn if questions or concerns.     Ferny Corrigan, Medical Student    The above patient was seen and evaluated with the medical student who acted as my scribe for the above note. Agree with note, changes made as appropriate.    Cristina Burroughs MD      "

## 2024-06-28 ENCOUNTER — HOSPITAL ENCOUNTER (INPATIENT)
Facility: CLINIC | Age: 29
LOS: 3 days | Discharge: HOME HEALTH | End: 2024-07-01
Attending: OBSTETRICS & GYNECOLOGY | Admitting: OBSTETRICS & GYNECOLOGY
Payer: COMMERCIAL

## 2024-06-28 ENCOUNTER — TELEPHONE (OUTPATIENT)
Dept: OBGYN | Facility: CLINIC | Age: 29
End: 2024-06-28
Payer: COMMERCIAL

## 2024-06-28 ENCOUNTER — NURSE TRIAGE (OUTPATIENT)
Dept: NURSING | Facility: CLINIC | Age: 29
End: 2024-06-28
Payer: COMMERCIAL

## 2024-06-28 ENCOUNTER — NURSE TRIAGE (OUTPATIENT)
Dept: OBGYN | Facility: CLINIC | Age: 29
End: 2024-06-28
Payer: COMMERCIAL

## 2024-06-28 PROBLEM — Z36.89 ENCOUNTER FOR TRIAGE IN PREGNANT PATIENT: Status: ACTIVE | Noted: 2024-06-28

## 2024-06-28 LAB
ABO/RH(D): NORMAL
ANTIBODY SCREEN: NEGATIVE
ERYTHROCYTE [DISTWIDTH] IN BLOOD BY AUTOMATED COUNT: 13.8 % (ref 10–15)
HCT VFR BLD AUTO: 39 % (ref 35–47)
HGB BLD-MCNC: 13.1 G/DL (ref 11.7–15.7)
MCH RBC QN AUTO: 30.4 PG (ref 26.5–33)
MCHC RBC AUTO-ENTMCNC: 33.6 G/DL (ref 31.5–36.5)
MCV RBC AUTO: 91 FL (ref 78–100)
PLATELET # BLD AUTO: 228 10E3/UL (ref 150–450)
RBC # BLD AUTO: 4.31 10E6/UL (ref 3.8–5.2)
RUPTURE OF FETAL MEMBRANES BY ROM PLUS: POSITIVE
SPECIMEN EXPIRATION DATE: NORMAL
WBC # BLD AUTO: 8.2 10E3/UL (ref 4–11)

## 2024-06-28 PROCEDURE — 85027 COMPLETE CBC AUTOMATED: CPT | Performed by: OBSTETRICS & GYNECOLOGY

## 2024-06-28 PROCEDURE — 36415 COLL VENOUS BLD VENIPUNCTURE: CPT | Performed by: OBSTETRICS & GYNECOLOGY

## 2024-06-28 PROCEDURE — 250N000013 HC RX MED GY IP 250 OP 250 PS 637: Performed by: OBSTETRICS & GYNECOLOGY

## 2024-06-28 PROCEDURE — 86780 TREPONEMA PALLIDUM: CPT | Performed by: OBSTETRICS & GYNECOLOGY

## 2024-06-28 PROCEDURE — 84112 EVAL AMNIOTIC FLUID PROTEIN: CPT | Performed by: OBSTETRICS & GYNECOLOGY

## 2024-06-28 PROCEDURE — 120N000001 HC R&B MED SURG/OB

## 2024-06-28 PROCEDURE — 86900 BLOOD TYPING SEROLOGIC ABO: CPT | Performed by: OBSTETRICS & GYNECOLOGY

## 2024-06-28 PROCEDURE — G0463 HOSPITAL OUTPT CLINIC VISIT: HCPCS

## 2024-06-28 RX ORDER — OXYTOCIN 10 [USP'U]/ML
10 INJECTION, SOLUTION INTRAMUSCULAR; INTRAVENOUS
Status: DISCONTINUED | OUTPATIENT
Start: 2024-06-28 | End: 2024-07-01 | Stop reason: HOSPADM

## 2024-06-28 RX ORDER — NALOXONE HYDROCHLORIDE 0.4 MG/ML
0.2 INJECTION, SOLUTION INTRAMUSCULAR; INTRAVENOUS; SUBCUTANEOUS
Status: DISCONTINUED | OUTPATIENT
Start: 2024-06-28 | End: 2024-06-29 | Stop reason: HOSPADM

## 2024-06-28 RX ORDER — FENTANYL CITRATE 50 UG/ML
50 INJECTION, SOLUTION INTRAMUSCULAR; INTRAVENOUS EVERY 30 MIN PRN
Status: DISCONTINUED | OUTPATIENT
Start: 2024-06-28 | End: 2024-06-29 | Stop reason: HOSPADM

## 2024-06-28 RX ORDER — OXYTOCIN/0.9 % SODIUM CHLORIDE 30/500 ML
340 PLASTIC BAG, INJECTION (ML) INTRAVENOUS CONTINUOUS PRN
Status: DISCONTINUED | OUTPATIENT
Start: 2024-06-28 | End: 2024-06-29 | Stop reason: HOSPADM

## 2024-06-28 RX ORDER — METHYLERGONOVINE MALEATE 0.2 MG/ML
200 INJECTION INTRAVENOUS
Status: DISCONTINUED | OUTPATIENT
Start: 2024-06-28 | End: 2024-06-29 | Stop reason: HOSPADM

## 2024-06-28 RX ORDER — TRANEXAMIC ACID 10 MG/ML
1 INJECTION, SOLUTION INTRAVENOUS EVERY 30 MIN PRN
Status: DISCONTINUED | OUTPATIENT
Start: 2024-06-28 | End: 2024-06-29 | Stop reason: HOSPADM

## 2024-06-28 RX ORDER — OXYTOCIN 10 [USP'U]/ML
10 INJECTION, SOLUTION INTRAMUSCULAR; INTRAVENOUS
Status: DISCONTINUED | OUTPATIENT
Start: 2024-06-28 | End: 2024-06-29 | Stop reason: HOSPADM

## 2024-06-28 RX ORDER — METOCLOPRAMIDE HYDROCHLORIDE 5 MG/ML
10 INJECTION INTRAMUSCULAR; INTRAVENOUS EVERY 6 HOURS PRN
Status: DISCONTINUED | OUTPATIENT
Start: 2024-06-28 | End: 2024-06-29 | Stop reason: HOSPADM

## 2024-06-28 RX ORDER — MISOPROSTOL 200 UG/1
400 TABLET ORAL
Status: DISCONTINUED | OUTPATIENT
Start: 2024-06-28 | End: 2024-06-29 | Stop reason: HOSPADM

## 2024-06-28 RX ORDER — LOPERAMIDE HCL 2 MG
4 CAPSULE ORAL
Status: DISCONTINUED | OUTPATIENT
Start: 2024-06-28 | End: 2024-06-29 | Stop reason: HOSPADM

## 2024-06-28 RX ORDER — MISOPROSTOL 100 UG/1
25 TABLET ORAL
Status: DISCONTINUED | OUTPATIENT
Start: 2024-06-28 | End: 2024-06-29 | Stop reason: HOSPADM

## 2024-06-28 RX ORDER — LOPERAMIDE HCL 2 MG
2 CAPSULE ORAL
Status: DISCONTINUED | OUTPATIENT
Start: 2024-06-28 | End: 2024-06-29 | Stop reason: HOSPADM

## 2024-06-28 RX ORDER — CITRIC ACID/SODIUM CITRATE 334-500MG
30 SOLUTION, ORAL ORAL
Status: DISCONTINUED | OUTPATIENT
Start: 2024-06-28 | End: 2024-06-29 | Stop reason: HOSPADM

## 2024-06-28 RX ORDER — HYDROXYZINE HYDROCHLORIDE 25 MG/1
50 TABLET, FILM COATED ORAL
Status: DISCONTINUED | OUTPATIENT
Start: 2024-06-28 | End: 2024-06-29 | Stop reason: HOSPADM

## 2024-06-28 RX ORDER — NALOXONE HYDROCHLORIDE 0.4 MG/ML
0.4 INJECTION, SOLUTION INTRAMUSCULAR; INTRAVENOUS; SUBCUTANEOUS
Status: DISCONTINUED | OUTPATIENT
Start: 2024-06-28 | End: 2024-06-29 | Stop reason: HOSPADM

## 2024-06-28 RX ORDER — OXYTOCIN/0.9 % SODIUM CHLORIDE 30/500 ML
100-340 PLASTIC BAG, INJECTION (ML) INTRAVENOUS CONTINUOUS PRN
Status: DISCONTINUED | OUTPATIENT
Start: 2024-06-28 | End: 2024-07-01 | Stop reason: HOSPADM

## 2024-06-28 RX ORDER — FAMOTIDINE 10 MG
20 TABLET ORAL ONCE
Status: COMPLETED | OUTPATIENT
Start: 2024-06-28 | End: 2024-06-28

## 2024-06-28 RX ORDER — ONDANSETRON 4 MG/1
4 TABLET, ORALLY DISINTEGRATING ORAL EVERY 6 HOURS PRN
Status: DISCONTINUED | OUTPATIENT
Start: 2024-06-28 | End: 2024-06-29 | Stop reason: HOSPADM

## 2024-06-28 RX ORDER — PROCHLORPERAZINE 25 MG
25 SUPPOSITORY, RECTAL RECTAL EVERY 12 HOURS PRN
Status: DISCONTINUED | OUTPATIENT
Start: 2024-06-28 | End: 2024-06-29 | Stop reason: HOSPADM

## 2024-06-28 RX ORDER — ACETAMINOPHEN 325 MG/1
650 TABLET ORAL EVERY 4 HOURS PRN
Status: DISCONTINUED | OUTPATIENT
Start: 2024-06-28 | End: 2024-06-29 | Stop reason: HOSPADM

## 2024-06-28 RX ORDER — KETOROLAC TROMETHAMINE 30 MG/ML
30 INJECTION, SOLUTION INTRAMUSCULAR; INTRAVENOUS
Status: DISCONTINUED | OUTPATIENT
Start: 2024-06-28 | End: 2024-07-01 | Stop reason: HOSPADM

## 2024-06-28 RX ORDER — ONDANSETRON 2 MG/ML
4 INJECTION INTRAMUSCULAR; INTRAVENOUS EVERY 6 HOURS PRN
Status: DISCONTINUED | OUTPATIENT
Start: 2024-06-28 | End: 2024-06-29 | Stop reason: HOSPADM

## 2024-06-28 RX ORDER — IBUPROFEN 400 MG/1
800 TABLET, FILM COATED ORAL
Status: DISCONTINUED | OUTPATIENT
Start: 2024-06-28 | End: 2024-07-01 | Stop reason: HOSPADM

## 2024-06-28 RX ORDER — LIDOCAINE 40 MG/G
CREAM TOPICAL
Status: DISCONTINUED | OUTPATIENT
Start: 2024-06-28 | End: 2024-06-29 | Stop reason: HOSPADM

## 2024-06-28 RX ORDER — PROCHLORPERAZINE MALEATE 5 MG
10 TABLET ORAL EVERY 6 HOURS PRN
Status: DISCONTINUED | OUTPATIENT
Start: 2024-06-28 | End: 2024-06-29 | Stop reason: HOSPADM

## 2024-06-28 RX ORDER — LIDOCAINE 40 MG/G
CREAM TOPICAL
Status: DISCONTINUED | OUTPATIENT
Start: 2024-06-28 | End: 2024-06-28 | Stop reason: HOSPADM

## 2024-06-28 RX ORDER — CARBOPROST TROMETHAMINE 250 UG/ML
250 INJECTION, SOLUTION INTRAMUSCULAR
Status: DISCONTINUED | OUTPATIENT
Start: 2024-06-28 | End: 2024-06-29 | Stop reason: HOSPADM

## 2024-06-28 RX ORDER — METOCLOPRAMIDE 10 MG/1
10 TABLET ORAL EVERY 6 HOURS PRN
Status: DISCONTINUED | OUTPATIENT
Start: 2024-06-28 | End: 2024-06-29 | Stop reason: HOSPADM

## 2024-06-28 RX ORDER — MISOPROSTOL 200 UG/1
800 TABLET ORAL
Status: DISCONTINUED | OUTPATIENT
Start: 2024-06-28 | End: 2024-06-29 | Stop reason: HOSPADM

## 2024-06-28 RX ADMIN — MISOPROSTOL 25 MCG: 100 TABLET ORAL at 18:13

## 2024-06-28 RX ADMIN — MISOPROSTOL 25 MCG: 100 TABLET ORAL at 20:14

## 2024-06-28 RX ADMIN — MISOPROSTOL 25 MCG: 100 TABLET ORAL at 22:16

## 2024-06-28 RX ADMIN — MISOPROSTOL 25 MCG: 100 TABLET ORAL at 16:05

## 2024-06-28 RX ADMIN — FAMOTIDINE 20 MG: 10 TABLET, FILM COATED ORAL at 22:16

## 2024-06-28 ASSESSMENT — ACTIVITIES OF DAILY LIVING (ADL)
ADLS_ACUITY_SCORE: 31

## 2024-06-28 NOTE — PROGRESS NOTES
" presents at 37 5/7 weeks gestation for evaluation of leaking of fluid. Patient awoke this morning and felt some wetness in her underwear. Patient then had 3 episodes of diarrhea.  Patient noted more fluid leaking with some \"red/brown discharge.\" Patient denies headache or uterine contractions. External monitors applied after verbal consent by patient. Admission database obtained and prenatal record reviewed. Vital signs obtained and WDL. Patient and spouse oriented to room, call light and plan of care while in the MAC.  "

## 2024-06-28 NOTE — TELEPHONE ENCOUNTER
Patient is a 29 yo  @ 37w5d who call in with her  due to concerns of abdominal cramping and associated diarrhea.  Patient notes she was woken out of sleep with some abdominal cramping.  She then had 2 episodes of diarrhea.  She notes that her pain has improved since having the bowel movements, but still not completely gone.  She denies any fevers/chills.  Some mild nausea but no vomiting.  She and  did order chicken wings and cheesecake last night which was different from their typical food.  She is able to drink water.  She does not feel these are contractions.  No VB or LOF.  + FM.  We discussed that her symptoms may be secondary to food and that she should continue to monitor symptoms and stay well hydrated with recurrent episodes of the diarrhea.  We discussed if she develops fevers/chills, has worsening abdominal cramping or multiple episodes of loose stools they should call back.  We discussed labor precautions as well should she develop any contractions.  Patient and  understand and are agreeable with plan.  They will call back if anything continues or worsens.    Monica Lopez MD

## 2024-06-28 NOTE — PROVIDER NOTIFICATION
06/28/24 1750   Provider Notification   Provider Name/Title Dr. Sanchez   Method of Notification Phone   Request Evaluate - Remote   Notification Reason Status Update     Continue with plan of care, PO cytotec

## 2024-06-28 NOTE — PROVIDER NOTIFICATION
06/28/24 1440   Provider Notification   Provider Name/Title Dr. Sanchez   Method of Notification Phone   Request Evaluate - Remote   Notification Reason Patient Arrived;Membrane Status;Labor Status;Uterine Activity;Lab/Diagnostic Study;Status Update;SVE     Orders received for admission to labor and delivery. Request in house provider to verify position.

## 2024-06-28 NOTE — TELEPHONE ENCOUNTER
"S-(situation): Pt  (Vic) on behalf of pt had called triage line reporting sx of spotting and abdominal cramping     B-(background): 37+5. First pregnancy     A-(assessment): Patient previously called in and spoke to on-call provider at around 530 am (see telephone encounter). Pt calling now and reporting she woke up and feeling \"I felt like I wet myself\". Pt went to the bathroom and noted \"red/brown spotting in pad and on toilet seat\". Pt was unable to report if she was having contractions. Reports lower abdominal pain \"like when I'm on my period that goes down to my thighs\", and says abdomen \"twitches\" denies radiating to her back as well. Also reports \"heaviness downwards towards uterus and vaginal area\" pt can't describe it as a constant heaviness or if it comes and goes. Pt reporting \"felt like a stretching feeling in uterus around 2 hours ago ( am)\". Pt denied nausea and diarrhea. Pt reports baby is moving and was meeting kick counts yesterday but felt like all day yesterday baby's kicks were \"weaker than usual\". Writer asked pt to check her pad again to see if she had anymore bleeding, pt checked and reported, \"nothing on my pad but when I wiped I noticed clear/yellow discharge\".     R-(recommendations): writer paged on called MD Dr. Baron who instructed to send pt to [facility] L&D. Writer called pt/pts  back with his information as well as [facility] address and called [facility]' L&D charge with report on pt hx and sx.     "

## 2024-06-28 NOTE — PROVIDER NOTIFICATION
06/28/24 1450   Provider Notification   Provider Name/Title Dr. Chao   Method of Notification At Bedside   Notification Reason Other (Comment)  (bedside US for fetal position.)

## 2024-06-28 NOTE — TELEPHONE ENCOUNTER
"OB Triage Call    Is patient's OB/Midwife with the formerly [facility] or [facility]? [facility] ([facility]: Located on the 3rd floor at [place]). At this time we do not triage for [facility]. Paged on-call provider Dr Lopez at 5:11a to contact patient directly. Patient directed to call back and request provider be re-paged if no response in 20 minutes.     Indicates pain improved with passing a BM- was severe initially upon waking up. Currently mild to moderate pain.     Is patient's delivering hospital on divert? Does not apply due to Provider will contact patient to develop plan of care      37w5d    Estimated Date of Delivery: 2024        OB History    Para Term  AB Living   1 0 0 0 0 0   SAB IAB Ectopic Multiple Live Births   0 0 0 0 0      # Outcome Date GA Lbr Louie/2nd Weight Sex Type Anes PTL Lv   1 Current               Obstetric Comments   *First Pregnancy            No results found for: \"GBS\"       Nikia Flannery RN   "

## 2024-06-28 NOTE — PROGRESS NOTES
Patient and spouse updated with plan of care. All questions answered. All belongings gathered and taken by patient's spouse. Patient and spouse escorted to room 214 for admission to labor and delivery. Patient and spouse oriented to room, call light and POC in labor.

## 2024-06-29 ENCOUNTER — ANESTHESIA EVENT (OUTPATIENT)
Dept: OBGYN | Facility: CLINIC | Age: 29
End: 2024-06-29
Payer: COMMERCIAL

## 2024-06-29 ENCOUNTER — ANESTHESIA (OUTPATIENT)
Dept: OBGYN | Facility: CLINIC | Age: 29
End: 2024-06-29
Payer: COMMERCIAL

## 2024-06-29 LAB — T PALLIDUM AB SER QL: NONREACTIVE

## 2024-06-29 PROCEDURE — 59400 OBSTETRICAL CARE: CPT | Performed by: ANESTHESIOLOGY

## 2024-06-29 PROCEDURE — 250N000011 HC RX IP 250 OP 636: Performed by: ANESTHESIOLOGY

## 2024-06-29 PROCEDURE — 370N000003 HC ANESTHESIA WARD SERVICE: Performed by: ANESTHESIOLOGY

## 2024-06-29 PROCEDURE — 00HU33Z INSERTION OF INFUSION DEVICE INTO SPINAL CANAL, PERCUTANEOUS APPROACH: ICD-10-PCS | Performed by: ANESTHESIOLOGY

## 2024-06-29 PROCEDURE — 722N000001 HC LABOR CARE VAGINAL DELIVERY SINGLE

## 2024-06-29 PROCEDURE — 258N000003 HC RX IP 258 OP 636: Performed by: OBSTETRICS & GYNECOLOGY

## 2024-06-29 PROCEDURE — 250N000013 HC RX MED GY IP 250 OP 250 PS 637: Performed by: OBSTETRICS & GYNECOLOGY

## 2024-06-29 PROCEDURE — 3E0R3BZ INTRODUCTION OF ANESTHETIC AGENT INTO SPINAL CANAL, PERCUTANEOUS APPROACH: ICD-10-PCS | Performed by: ANESTHESIOLOGY

## 2024-06-29 PROCEDURE — 250N000009 HC RX 250: Performed by: OBSTETRICS & GYNECOLOGY

## 2024-06-29 PROCEDURE — 0KQM0ZZ REPAIR PERINEUM MUSCLE, OPEN APPROACH: ICD-10-PCS | Performed by: OBSTETRICS & GYNECOLOGY

## 2024-06-29 PROCEDURE — 120N000012 HC R&B POSTPARTUM

## 2024-06-29 PROCEDURE — 250N000011 HC RX IP 250 OP 636: Performed by: OBSTETRICS & GYNECOLOGY

## 2024-06-29 RX ORDER — NALBUPHINE HYDROCHLORIDE 20 MG/ML
2.5-5 INJECTION, SOLUTION INTRAMUSCULAR; INTRAVENOUS; SUBCUTANEOUS EVERY 6 HOURS PRN
Status: DISCONTINUED | OUTPATIENT
Start: 2024-06-29 | End: 2024-07-01 | Stop reason: HOSPADM

## 2024-06-29 RX ORDER — MISOPROSTOL 200 UG/1
400 TABLET ORAL
Status: DISCONTINUED | OUTPATIENT
Start: 2024-06-29 | End: 2024-07-01 | Stop reason: HOSPADM

## 2024-06-29 RX ORDER — METHYLERGONOVINE MALEATE 0.2 MG/ML
200 INJECTION INTRAVENOUS
Status: DISCONTINUED | OUTPATIENT
Start: 2024-06-29 | End: 2024-07-01 | Stop reason: HOSPADM

## 2024-06-29 RX ORDER — EPHEDRINE SULFATE 50 MG/ML
5 INJECTION, SOLUTION INTRAMUSCULAR; INTRAVENOUS; SUBCUTANEOUS
Status: DISCONTINUED | OUTPATIENT
Start: 2024-06-29 | End: 2024-06-29 | Stop reason: HOSPADM

## 2024-06-29 RX ORDER — OXYTOCIN 10 [USP'U]/ML
10 INJECTION, SOLUTION INTRAMUSCULAR; INTRAVENOUS
Status: DISCONTINUED | OUTPATIENT
Start: 2024-06-29 | End: 2024-07-01 | Stop reason: HOSPADM

## 2024-06-29 RX ORDER — HYDROCORTISONE 25 MG/G
CREAM TOPICAL 3 TIMES DAILY PRN
Status: DISCONTINUED | OUTPATIENT
Start: 2024-06-29 | End: 2024-07-01 | Stop reason: HOSPADM

## 2024-06-29 RX ORDER — LOPERAMIDE HCL 2 MG
4 CAPSULE ORAL
Status: DISCONTINUED | OUTPATIENT
Start: 2024-06-29 | End: 2024-07-01 | Stop reason: HOSPADM

## 2024-06-29 RX ORDER — LOPERAMIDE HCL 2 MG
2 CAPSULE ORAL
Status: DISCONTINUED | OUTPATIENT
Start: 2024-06-29 | End: 2024-07-01 | Stop reason: HOSPADM

## 2024-06-29 RX ORDER — FENTANYL CITRATE 50 UG/ML
INJECTION, SOLUTION INTRAMUSCULAR; INTRAVENOUS
Status: COMPLETED | OUTPATIENT
Start: 2024-06-29 | End: 2024-06-29

## 2024-06-29 RX ORDER — BISACODYL 10 MG
10 SUPPOSITORY, RECTAL RECTAL DAILY PRN
Status: DISCONTINUED | OUTPATIENT
Start: 2024-06-29 | End: 2024-07-01 | Stop reason: HOSPADM

## 2024-06-29 RX ORDER — SODIUM CHLORIDE, SODIUM LACTATE, POTASSIUM CHLORIDE, CALCIUM CHLORIDE 600; 310; 30; 20 MG/100ML; MG/100ML; MG/100ML; MG/100ML
INJECTION, SOLUTION INTRAVENOUS CONTINUOUS PRN
Status: DISCONTINUED | OUTPATIENT
Start: 2024-06-29 | End: 2024-06-29 | Stop reason: HOSPADM

## 2024-06-29 RX ORDER — ROPIVACAINE HYDROCHLORIDE 2 MG/ML
INJECTION, SOLUTION EPIDURAL; INFILTRATION; PERINEURAL
Status: COMPLETED | OUTPATIENT
Start: 2024-06-29 | End: 2024-06-29

## 2024-06-29 RX ORDER — ROPIVACAINE HYDROCHLORIDE 2 MG/ML
10 INJECTION, SOLUTION EPIDURAL; INFILTRATION; PERINEURAL ONCE
Status: DISCONTINUED | OUTPATIENT
Start: 2024-06-29 | End: 2024-06-29 | Stop reason: HOSPADM

## 2024-06-29 RX ORDER — ACETAMINOPHEN 325 MG/1
650 TABLET ORAL EVERY 4 HOURS PRN
Status: DISCONTINUED | OUTPATIENT
Start: 2024-06-29 | End: 2024-07-01 | Stop reason: HOSPADM

## 2024-06-29 RX ORDER — LIDOCAINE 40 MG/G
CREAM TOPICAL
Status: DISCONTINUED | OUTPATIENT
Start: 2024-06-29 | End: 2024-06-29 | Stop reason: HOSPADM

## 2024-06-29 RX ORDER — DOCUSATE SODIUM 100 MG/1
100 CAPSULE, LIQUID FILLED ORAL DAILY
Status: DISCONTINUED | OUTPATIENT
Start: 2024-06-29 | End: 2024-07-01 | Stop reason: HOSPADM

## 2024-06-29 RX ORDER — OXYTOCIN/0.9 % SODIUM CHLORIDE 30/500 ML
1-24 PLASTIC BAG, INJECTION (ML) INTRAVENOUS CONTINUOUS
Status: DISCONTINUED | OUTPATIENT
Start: 2024-06-29 | End: 2024-06-29 | Stop reason: HOSPADM

## 2024-06-29 RX ORDER — IBUPROFEN 400 MG/1
800 TABLET, FILM COATED ORAL EVERY 6 HOURS PRN
Status: DISCONTINUED | OUTPATIENT
Start: 2024-06-29 | End: 2024-07-01 | Stop reason: HOSPADM

## 2024-06-29 RX ORDER — SODIUM CHLORIDE, SODIUM LACTATE, POTASSIUM CHLORIDE, CALCIUM CHLORIDE 600; 310; 30; 20 MG/100ML; MG/100ML; MG/100ML; MG/100ML
INJECTION, SOLUTION INTRAVENOUS CONTINUOUS
Status: DISCONTINUED | OUTPATIENT
Start: 2024-06-29 | End: 2024-06-30

## 2024-06-29 RX ORDER — CARBOPROST TROMETHAMINE 250 UG/ML
250 INJECTION, SOLUTION INTRAMUSCULAR
Status: DISCONTINUED | OUTPATIENT
Start: 2024-06-29 | End: 2024-07-01 | Stop reason: HOSPADM

## 2024-06-29 RX ORDER — FENTANYL CITRATE 50 UG/ML
100 INJECTION, SOLUTION INTRAMUSCULAR; INTRAVENOUS ONCE
Status: DISCONTINUED | OUTPATIENT
Start: 2024-06-29 | End: 2024-06-29 | Stop reason: HOSPADM

## 2024-06-29 RX ORDER — OXYTOCIN/0.9 % SODIUM CHLORIDE 30/500 ML
340 PLASTIC BAG, INJECTION (ML) INTRAVENOUS CONTINUOUS PRN
Status: DISCONTINUED | OUTPATIENT
Start: 2024-06-29 | End: 2024-07-01 | Stop reason: HOSPADM

## 2024-06-29 RX ORDER — MODIFIED LANOLIN
OINTMENT (GRAM) TOPICAL
Status: DISCONTINUED | OUTPATIENT
Start: 2024-06-29 | End: 2024-07-01 | Stop reason: HOSPADM

## 2024-06-29 RX ORDER — TRANEXAMIC ACID 10 MG/ML
1 INJECTION, SOLUTION INTRAVENOUS EVERY 30 MIN PRN
Status: DISCONTINUED | OUTPATIENT
Start: 2024-06-29 | End: 2024-07-01 | Stop reason: HOSPADM

## 2024-06-29 RX ORDER — MISOPROSTOL 200 UG/1
800 TABLET ORAL
Status: DISCONTINUED | OUTPATIENT
Start: 2024-06-29 | End: 2024-07-01 | Stop reason: HOSPADM

## 2024-06-29 RX ADMIN — ACETAMINOPHEN 650 MG: 325 TABLET, FILM COATED ORAL at 19:48

## 2024-06-29 RX ADMIN — MISOPROSTOL 25 MCG: 100 TABLET ORAL at 04:36

## 2024-06-29 RX ADMIN — SODIUM CHLORIDE, POTASSIUM CHLORIDE, SODIUM LACTATE AND CALCIUM CHLORIDE 1000 ML: 600; 310; 30; 20 INJECTION, SOLUTION INTRAVENOUS at 07:40

## 2024-06-29 RX ADMIN — SODIUM CHLORIDE, POTASSIUM CHLORIDE, SODIUM LACTATE AND CALCIUM CHLORIDE: 600; 310; 30; 20 INJECTION, SOLUTION INTRAVENOUS at 08:54

## 2024-06-29 RX ADMIN — DOCUSATE SODIUM 100 MG: 100 CAPSULE, LIQUID FILLED ORAL at 19:48

## 2024-06-29 RX ADMIN — FENTANYL CITRATE 50 MCG: 50 INJECTION, SOLUTION INTRAMUSCULAR; INTRAVENOUS at 07:08

## 2024-06-29 RX ADMIN — MISOPROSTOL 25 MCG: 100 TABLET ORAL at 07:48

## 2024-06-29 RX ADMIN — Medication 2 MILLI-UNITS/MIN: at 12:21

## 2024-06-29 RX ADMIN — METOCLOPRAMIDE 10 MG: 5 INJECTION, SOLUTION INTRAMUSCULAR; INTRAVENOUS at 14:33

## 2024-06-29 RX ADMIN — IBUPROFEN 800 MG: 400 TABLET ORAL at 19:48

## 2024-06-29 RX ADMIN — FENTANYL CITRATE 100 MCG: 50 INJECTION INTRAMUSCULAR; INTRAVENOUS at 08:33

## 2024-06-29 RX ADMIN — SODIUM CHLORIDE, POTASSIUM CHLORIDE, SODIUM LACTATE AND CALCIUM CHLORIDE: 600; 310; 30; 20 INJECTION, SOLUTION INTRAVENOUS at 15:20

## 2024-06-29 RX ADMIN — MISOPROSTOL 25 MCG: 100 TABLET ORAL at 09:48

## 2024-06-29 RX ADMIN — Medication: at 08:31

## 2024-06-29 RX ADMIN — FENTANYL CITRATE 50 MCG: 50 INJECTION, SOLUTION INTRAMUSCULAR; INTRAVENOUS at 06:01

## 2024-06-29 RX ADMIN — MISOPROSTOL 25 MCG: 100 TABLET ORAL at 00:22

## 2024-06-29 RX ADMIN — ROPIVACAINE HYDROCHLORIDE 8 ML: 2 INJECTION, SOLUTION EPIDURAL; INFILTRATION at 08:33

## 2024-06-29 RX ADMIN — Medication: at 17:54

## 2024-06-29 RX ADMIN — MISOPROSTOL 25 MCG: 100 TABLET ORAL at 02:21

## 2024-06-29 ASSESSMENT — ACTIVITIES OF DAILY LIVING (ADL)
ADLS_ACUITY_SCORE: 22
ADLS_ACUITY_SCORE: 22
ADLS_ACUITY_SCORE: 31
ADLS_ACUITY_SCORE: 22
ADLS_ACUITY_SCORE: 31
ADLS_ACUITY_SCORE: 31
ADLS_ACUITY_SCORE: 18
ADLS_ACUITY_SCORE: 24
ADLS_ACUITY_SCORE: 22
ADLS_ACUITY_SCORE: 24
ADLS_ACUITY_SCORE: 22
ADLS_ACUITY_SCORE: 22
ADLS_ACUITY_SCORE: 31
ADLS_ACUITY_SCORE: 31
ADLS_ACUITY_SCORE: 22
ADLS_ACUITY_SCORE: 31
ADLS_ACUITY_SCORE: 22
ADLS_ACUITY_SCORE: 31
ADLS_ACUITY_SCORE: 31
ADLS_ACUITY_SCORE: 22

## 2024-06-29 NOTE — PROGRESS NOTES
Irma educated on pitocin indication, procedure, and risks vs benefits. Pt in agreement. Started on pitocin.

## 2024-06-29 NOTE — PROVIDER NOTIFICATION
Spoke with Dr Andrade regarding pt concern for numbness in relation to epidural. Pt feels epidural is working too well and requesting adjustment. Per Dr Andrade, pause epidural for 30 minutes and restart at lower rate of 10 mL/hr.

## 2024-06-29 NOTE — PLAN OF CARE
Goal Outcome Evaluation:      Plan of Care Reviewed With: patient, spouse, family    Overall Patient Progress: no changeOverall Patient Progress: no change         Discussed current labor progress, monitoring, medications, and frequency of vital signs.

## 2024-06-29 NOTE — PROVIDER NOTIFICATION
"Dr Dickens paged with update, \"SVE 7-8/90/0. eleazar every 1-3 minutes. On 4 of pitocin. FHTs 135 baseline, moderate and minimal at times variability, +accels, no decels. Thank you!\"    Return call from Dr Dickens, plan to recheck pt in 2 hours or sooner if indicated.   "

## 2024-06-29 NOTE — L&D DELIVERY NOTE
Admission date: 2024  Delivery date:  24  Place of delivery: [facility]    The patient is a 28 year old  at 37w6d  wks gestation admitted to labor and delivery for PPROM.  Her  course was otherwise uncomplicated.  The estimate fetal weight is 6.5#.  She was an unassigned patient who presented because her hospital was closed to laboring patients.      For pain management she had an epidural with good relief.  Pitocin was titrated.   Membranes were spontaneously ruptured and the fluid was clear.      She progressed to complete dilation at 4:30 pm.  She had excellent maternal expulsive efforts, and after 1 hour of pushing, she delivered a viable male infant weighing 6 pounds 6 ounces with apgars of 8 at 1 min and 9 at 5 minutes, via a normal spontaneous vaginal delivery over an intact perineum.  The infant was vigorous, and mouth and nose were bulb suctioned upon delivery.  The infant was handed off to his parents and the nursery team in attendance.    The placenta delivered spontaneously and intact.  The uterus was made firm with IV pitocin and uterine massage.  The estimated blood loss was 400 mL.    The cervix and vagina were inspected.  There was a deep second degree perineal laceration which was repaired with 3-0 vicryl assuring hemostasis and close approximation.  The patient tolerated this well.     During labor the fetal heart tones were category 1 to 2.    Mother and baby did well and went to normal postpartum and  nursery.    GBS was negative.  Sponge, needle and instrument count were correct x 2.     Alice Dickens MD

## 2024-06-29 NOTE — ANESTHESIA PREPROCEDURE EVALUATION
Anesthesia Pre-Procedure Evaluation    Patient: Irma Weinberg   MRN: [id number] : 1995        Procedure :           History reviewed. No pertinent past medical history.   Past Surgical History:   Procedure Laterality Date    EYE SURGERY      LASIK      No Known Allergies   Social History     Tobacco Use    Smoking status: Never    Smokeless tobacco: Never   Substance Use Topics    Alcohol use: Never      Wt Readings from Last 1 Encounters:   24 82.1 kg (181 lb)        Prior to Admission medications    Medication Sig Start Date End Date Taking? Authorizing Provider   calcium carbonate (OS-CASEY) 1500 (600 Ca) MG tablet Take by mouth daily   Yes Reported, Patient   calcium carbonate (TUMS) 500 MG chewable tablet Take 1 chew tab by mouth as needed for heartburn   Yes Reported, Patient   Cyanocobalamin (VITAMIN B-12 PO)    Yes Reported, Patient   famotidine (PEPCID) 20 MG tablet Take 20 mg by mouth daily   Yes Reported, Patient   ferrous sulfate (FEROSUL) 325 (65 Fe) MG tablet Take 1 tablet (325 mg) by mouth daily (with breakfast) 4/10/24  Yes Anna Ceballos APRN CNM   Prenatal Vit-Fe Fumarate-FA (PRENATAL MULTIVITAMIN  PLUS IRON) 27-1 MG TABS Take 1 tablet by mouth daily   Yes Reported, Patient   vitamin C (ASCORBIC ACID) 250 MG tablet Take 1 tablet (250 mg) by mouth daily 4/10/24  Yes Anna Ceballos APRN CNM   vitamin D3 (CHOLECALCIFEROL) 125 MCG (5000 UT) tablet Take 1 tablet (125 mcg) by mouth daily 23  Yes Anna Ceballos APRN CNM       Anesthesia Evaluation            ROS/MED HX  ENT/Pulmonary:  - neg pulmonary ROS     Neurologic:  - neg neurologic ROS     Cardiovascular:  - neg cardiovascular ROS     METS/Exercise Tolerance:     Hematologic:       Musculoskeletal:       GI/Hepatic:     (+) GERD,                   Renal/Genitourinary:       Endo:  - neg endo ROS     Psychiatric/Substance Use:  - neg psychiatric ROS     Infectious Disease:       Malignancy:       Other:       "      Physical Exam    Airway        Mallampati: II   TM distance: > 3 FB   Neck ROM: full   Mouth opening: > 3 cm    Respiratory Devices and Support         Dental           Cardiovascular             Pulmonary                   OUTSIDE LABS:  CBC:   Lab Results   Component Value Date    WBC 8.2 06/28/2024    WBC 8.8 05/22/2024    HGB 13.1 06/28/2024    HGB 11.8 05/22/2024    HCT 39.0 06/28/2024    HCT 36.1 05/22/2024     06/28/2024     05/22/2024     BMP:   Lab Results   Component Value Date     05/08/2024     (L) 05/02/2024    POTASSIUM 4.3 05/08/2024    POTASSIUM 3.2 (L) 05/02/2024    CHLORIDE 104 05/08/2024    CHLORIDE 102 05/02/2024    CO2 24 05/08/2024    CO2 21 (L) 05/02/2024    BUN 5.5 (L) 05/08/2024    BUN 8.5 05/02/2024    CR 0.46 (L) 05/08/2024    CR 0.46 (L) 05/02/2024    GLC 87 05/08/2024     (H) 05/02/2024     COAGS: No results found for: \"PTT\", \"INR\", \"FIBR\"  POC: No results found for: \"BGM\", \"HCG\", \"HCGS\"  HEPATIC:   Lab Results   Component Value Date    ALBUMIN 3.3 (L) 05/08/2024    PROTTOTAL 6.2 (L) 05/08/2024    ALT 11 05/08/2024    AST 17 05/08/2024    ALKPHOS 105 05/08/2024    BILITOTAL <0.2 05/08/2024     OTHER:   Lab Results   Component Value Date    A1C 4.6 12/18/2023    CASEY 8.3 (L) 05/08/2024    TSH 3.53 03/13/2024       Anesthesia Plan    ASA Status:  2       Anesthesia Type: Epidural.              Consents    Anesthesia Plan(s) and associated risks, benefits, and realistic alternatives discussed. Questions answered and patient/representative(s) expressed understanding.     - Discussed:     - Discussed with:  Patient            Postoperative Care            Comments:           neg OB ROS.      Sanjuanita Andrade MD    I have reviewed the pertinent notes and labs in the chart from the past 30 days and (re)examined the patient.  Any updates or changes from those notes are reflected in this note.                  "

## 2024-06-29 NOTE — PROGRESS NOTES
Irma requested epidural for labor pain. Pt and spouse educated on epidural indication, procedure, and risks vs benefits. In agreement to proceed. 1L bolus given. Dr Andrade paged for orders and orders received. Pt tolerated procedure well. Comfortable post procedure.

## 2024-06-29 NOTE — H&P
"Labor and delivery admit note.  HPI  Irma Weinberg  is a 28 year old   at 37w5d who was admitted for PPROM.      Estimated Date of Delivery: 2024  37w6d  Prenatal care - early and adequate with an outside provider, and also followed by MFM; dated by LMP c/w first trimester USG  Prenatal course - uncomplicated    Prenatal labs  Blood type A, Rh positive  HIV-negative  Hepatitis BsAg- negative  Trep AB- Negative  Rubella- Immune  Pap- normal  GC/Chlamydia- negative  Quad screen- normal  Glucola- normal  GBS- negative.    History reviewed. No pertinent past medical history.  Past Surgical History:   Procedure Laterality Date    EYE SURGERY      LASIK     Social History     Tobacco Use    Smoking status: Never    Smokeless tobacco: Never   Vaping Use    Vaping status: Never Used   Substance Use Topics    Alcohol use: Never    Drug use: Never       Objective  Alert and oriented  /61   Pulse 85   Temp 97  F (36.1  C) (Temporal)   Resp 16   Ht 1.58 m (5' 2.21\")   Wt 82.1 kg (181 lb)   LMP 10/08/2023 (Exact Date)   SpO2 98%   BMI 32.89 kg/m    Heart and Lungs- normal  PA- uterus full term. Contractions, irregular  FHR- 130 baseline, good variability, accels, no decels  Pelvic ( by admitting RN )  Cx:     Assessment   at 37weeks in PPROM.     Uneventful prenatal course.    Plan  Admit.  Expectant.  Routine intrapartum management.    Alice Dickens MD  "

## 2024-06-29 NOTE — PLAN OF CARE
Goal Outcome Evaluation:      Plan of Care Reviewed With: patient, spouse    Overall Patient Progress: improvingOverall Patient Progress: improving    Outcome Evaluation: deliver baby    Irma is progressing adequately through labor. Comfortable with epidural. Tolerating clear liquid diet. Intermittent nausea, reglan x1. Last SVE 7-8/90/0. Leaking clear fluid. Anticipate .

## 2024-06-29 NOTE — ANESTHESIA PROCEDURE NOTES
Epidural catheter Procedure Note    Pre-Procedure   Staff -        Anesthesiologist:  Sanjuanita Andrade MD       Performed By: anesthesiologist       Location: OB       Pre-Anesthestic Checklist: patient identified, IV checked, site marked, risks and benefits discussed, informed consent, monitors and equipment checked, pre-op evaluation and at physician/surgeon's request  Timeout:       Correct Patient: Yes        Correct Procedure: Yes        Correct Site: Yes        Correct Position: Yes   Procedure Documentation  Procedure: epidural catheter       Patient Position: sitting       Skin prep: Betadine       Local skin infiltrated with 1 mL of 1% lidocaine.        Insertion Site: L2-3. (midline approach).       Technique: LORT saline and LORT air        Needle Type: Touhy needle       Needle Gauge: 17.        Needle Length (Inches): 3.5        Catheter: 19 G.          Catheter threaded easily.             # of attempts: 1 and  # of redirects:     Assessment/Narrative         Paresthesias: No.       Test dose of 3 mL lidocaine 1.5% w/ 1:200,000 epinephrine at 08:30 CDT.         Test dose negative, 3 minutes after injection, for signs of intravascular, subdural, or intrathecal injection.       Insertion/Infusion Method: LORT saline and LORT air       Aspiration negative for Heme or CSF via Epidural Catheter.    Medication(s) Administered   0.2% Ropivacaine (Epidural) - EPIDURAL   8 mL - 6/29/2024 8:33:00 AM  Fentanyl PF (Epidural) - EPIDURAL   100 mcg - 6/29/2024 8:33:00 AM   Comments:  Pre-procedure time out completed. Patient in sitting position, the lumbar spine was prepped and draped in sterile fashion. The L2/L3 interspace was identified and local anesthetic was injected for local skin infiltration. A 17 G touhy needle was advanced to the epidural space which was confirmed with the loss of resistance technique at 5 cm. A catheter was then advanced easily into the epidural space. The catheter was left at 9 cm at  "the skin. Negative aspiration of blood and CSF was confirmed. A test dose of 1.5% lidocaine with 1:200,000 epinephrine was injected through the catheter and was negative for intravascular injection. The site was covered with sterile tegaderm and the catheter was secured with tape.       FOR [facility] ([facility] ONLY:   Pain Team Contact information: please page the Pain Team Via [employer]. Search \"Pain\". During daytime hours, please page the attending first. At night please page the resident first.      "

## 2024-06-30 PROCEDURE — 250N000013 HC RX MED GY IP 250 OP 250 PS 637: Performed by: OBSTETRICS & GYNECOLOGY

## 2024-06-30 PROCEDURE — 120N000012 HC R&B POSTPARTUM

## 2024-06-30 RX ORDER — FAMOTIDINE 10 MG
10 TABLET ORAL 2 TIMES DAILY
Status: DISCONTINUED | OUTPATIENT
Start: 2024-06-30 | End: 2024-07-01 | Stop reason: HOSPADM

## 2024-06-30 RX ADMIN — IBUPROFEN 800 MG: 400 TABLET ORAL at 03:51

## 2024-06-30 RX ADMIN — IBUPROFEN 800 MG: 400 TABLET ORAL at 09:12

## 2024-06-30 RX ADMIN — FAMOTIDINE 10 MG: 10 TABLET, FILM COATED ORAL at 09:12

## 2024-06-30 RX ADMIN — DOCUSATE SODIUM 100 MG: 100 CAPSULE, LIQUID FILLED ORAL at 09:12

## 2024-06-30 RX ADMIN — ACETAMINOPHEN 650 MG: 325 TABLET, FILM COATED ORAL at 09:12

## 2024-06-30 RX ADMIN — IBUPROFEN 800 MG: 400 TABLET ORAL at 21:31

## 2024-06-30 RX ADMIN — ACETAMINOPHEN 650 MG: 325 TABLET, FILM COATED ORAL at 03:51

## 2024-06-30 RX ADMIN — ACETAMINOPHEN 650 MG: 325 TABLET, FILM COATED ORAL at 19:40

## 2024-06-30 RX ADMIN — FAMOTIDINE 10 MG: 10 TABLET, FILM COATED ORAL at 21:16

## 2024-06-30 RX ADMIN — ACETAMINOPHEN 650 MG: 325 TABLET, FILM COATED ORAL at 14:33

## 2024-06-30 RX ADMIN — IBUPROFEN 800 MG: 400 TABLET ORAL at 15:18

## 2024-06-30 ASSESSMENT — ACTIVITIES OF DAILY LIVING (ADL)
ADLS_ACUITY_SCORE: 19
ADLS_ACUITY_SCORE: 22
ADLS_ACUITY_SCORE: 19
ADLS_ACUITY_SCORE: 22
ADLS_ACUITY_SCORE: 19
ADLS_ACUITY_SCORE: 22
ADLS_ACUITY_SCORE: 19
ADLS_ACUITY_SCORE: 22
ADLS_ACUITY_SCORE: 22
ADLS_ACUITY_SCORE: 19
ADLS_ACUITY_SCORE: 19
ADLS_ACUITY_SCORE: 22

## 2024-06-30 NOTE — PLAN OF CARE
Goal Outcome Evaluation:      Plan of Care Reviewed With: patient, spouse    Overall Patient Progress: improvingOverall Patient Progress: improving  VSS. Basma c/o aminta pressure; using ice packs, tylenol and ibuprofen. Bowel management reviewed as well as comfort techniques. Breastfeeding well with some assist.  at bedside; supportive in assisting with patient and infant cares.

## 2024-06-30 NOTE — PLAN OF CARE
Goal Outcome Evaluation:      Plan of Care Reviewed With: patient, spouse    Overall Patient Progress: improvingOverall Patient Progress: improving  Infant transferred from L&D at 2100 carrying infant in arms. Pt. and spouse oriented to surroundings, call light, and infant safety. ID bands double-checked.  Call light in reach.  Vital signs stable. Postpartum assessment WDL. Pain controlled with Tylenol and Ibuprofen. Patient voiding without difficulty. Breastfeeding on cue with assist. Patient and spouse bonding well with infant. Will continue with current plan of care.

## 2024-06-30 NOTE — PROGRESS NOTES
Data: Patient transferred to 421 via wheelchair at 2100. Baby transferred via parent's arms.  Action: Receiving unit notified of transfer: Yes. Patient and family notified of room change. Report given to MARTHA Enriquez. Belongings remain with patient. Oriented patient to surroundings. Call light within reach. ID bands double-checked with receiving RN.  Response: Patient tolerated transfer and is stable.

## 2024-06-30 NOTE — PLAN OF CARE
Goal Outcome Evaluation:      Plan of Care Reviewed With: patient, spouse    Overall Patient Progress: improvingOverall Patient Progress: improving    Outcome Evaluation: deliver baby    Basma became complete. MD and RN providing continuous labor support while pushing and review of fetal monitor tracing. Excellent maternal pushing effort.  of infant male at 1811. 2nd degree laceration repaired by MD. Scant to light rubra lochia. Eldridge dc'd prior to pushing, due to void post delivery. Epidural provided adequate pain relief, stopped, plan for tylenol and ibuprofen. Working on breastfeeding. Bonding well with family unit.

## 2024-06-30 NOTE — ANESTHESIA POSTPROCEDURE EVALUATION
Patient: Irma Weinberg    Procedure: * No procedures listed *       Anesthesia Type:  Epidural    Note:  Disposition: Admission; Inpatient   Postop Pain Control: Uneventful            Sign Out: Well controlled pain   PONV: No   Neuro/Psych: Uneventful            Sign Out: Acceptable/Baseline neuro status   Airway/Respiratory: Uneventful            Sign Out: Acceptable/Baseline resp. status   CV/Hemodynamics: Uneventful            Sign Out: Acceptable CV status; No obvious hypovolemia; No obvious fluid overload   Other NRE: NONE   DID A NON-ROUTINE EVENT OCCUR?     Event details/Postop Comments:  Chart reviewed and assessed; no apparent complications identified.    Pablo Rodriguez [facility] reviewed and assessed; no apparent complications identified.    Pablo Rodriguez MD           Last vitals:  Vitals:    06/29/24 2013 06/29/24 2018 06/29/24 2100   BP:  131/69 120/72   Pulse:   54   Resp:   16   Temp:   37.1  C (98.8  F)   SpO2: 100% 100%        Electronically Signed By: Pablo Rodriguez MD  June 29, 2024  9:58 PM

## 2024-06-30 NOTE — PROGRESS NOTES
S: Patient doing well with no overnight issues and no current complaints.  Pain is well controlled.  Tolerating PO intake.  Breast feeding without issues.  Ambulating without difficulty.  Voiding and passing flatus.  Lochia is less than normal menses and decreasing.  Denies fevers, headaches, changes in vision, chest pain, SOB, nausea, vomiting, diarrhea, constipation, RUQ tenderness, dysuria, or LE pain.    O:   Vitals:    24 2018 24 2100 24 0100 24 0500   BP: 131/69 120/72 118/69 118/75   BP Location:  Right arm Right arm Right arm   Patient Position:       Cuff Size:       Pulse:  54 82 64   Resp:  16 16 16   Temp:  98.8  F (37.1  C) 98.8  F (37.1  C) 98.2  F (36.8  C)   TempSrc:  Oral Oral Oral   SpO2: 100%      Weight:       Height:         NAD, AAOx3, RRR, CTAB, ABd soft NTND, Firm fundus 1cm below the umbilicus, LE NT no edema    A: 29yo  s/PPD#1  who is recovering well.    P: Routine post partum care.  Plan for discharge PPD#2.  Add Pepcid for GERD.

## 2024-07-01 VITALS
BODY MASS INDEX: 33.31 KG/M2 | TEMPERATURE: 98.3 F | SYSTOLIC BLOOD PRESSURE: 123 MMHG | RESPIRATION RATE: 16 BRPM | HEART RATE: 57 BPM | DIASTOLIC BLOOD PRESSURE: 66 MMHG | WEIGHT: 181 LBS | OXYGEN SATURATION: 100 % | HEIGHT: 62 IN

## 2024-07-01 PROCEDURE — 250N000013 HC RX MED GY IP 250 OP 250 PS 637: Performed by: OBSTETRICS & GYNECOLOGY

## 2024-07-01 RX ORDER — IBUPROFEN 800 MG/1
800 TABLET, FILM COATED ORAL EVERY 6 HOURS PRN
COMMUNITY
Start: 2024-07-01 | End: 2024-08-08

## 2024-07-01 RX ORDER — ACETAMINOPHEN 325 MG/1
650 TABLET ORAL EVERY 4 HOURS PRN
COMMUNITY
Start: 2024-07-01 | End: 2024-08-08

## 2024-07-01 RX ADMIN — ACETAMINOPHEN 650 MG: 325 TABLET, FILM COATED ORAL at 11:49

## 2024-07-01 RX ADMIN — ACETAMINOPHEN 650 MG: 325 TABLET, FILM COATED ORAL at 03:14

## 2024-07-01 RX ADMIN — DOCUSATE SODIUM 100 MG: 100 CAPSULE, LIQUID FILLED ORAL at 08:31

## 2024-07-01 RX ADMIN — FAMOTIDINE 10 MG: 10 TABLET, FILM COATED ORAL at 08:31

## 2024-07-01 RX ADMIN — IBUPROFEN 800 MG: 400 TABLET ORAL at 11:50

## 2024-07-01 ASSESSMENT — ACTIVITIES OF DAILY LIVING (ADL)
ADLS_ACUITY_SCORE: 18
ADLS_ACUITY_SCORE: 19
ADLS_ACUITY_SCORE: 18
ADLS_ACUITY_SCORE: 19
ADLS_ACUITY_SCORE: 18
ADLS_ACUITY_SCORE: 19
ADLS_ACUITY_SCORE: 18
ADLS_ACUITY_SCORE: 18

## 2024-07-01 NOTE — PLAN OF CARE
Vital signs stable. Postpartum assessment WDL. Pain controlled with tylenol, ibuprofen, ice pads. Patient voiding without difficulty. Breastfeeding on cue with minimal assistance for positioning/checking latch. Patient and infant bonding well. Questions answered, patient belongings returned.

## 2024-07-01 NOTE — PLAN OF CARE
Goal Outcome Evaluation:      Plan of Care Reviewed With: patient, spouse    Overall Patient Progress: improvingOverall Patient Progress: improving  Vital signs stable. Postpartum assessment WDL. Pt. c/o perineum pain controlled with Tylenol, Toradol and ice packs. Patient voiding without difficulty. Breastfeeding on cue with minimal assist. Patient and spouse bonding well with infant. Will continue with current plan of care.

## 2024-07-01 NOTE — PROGRESS NOTES
S: Patient doing well with no overnight issues and no current complaints.  Pain is well controlled.  Tolerating PO intake.  Breast feeding without issues.  Ambulating without difficulty.  Voiding and passing flatus.  Lochia is less than normal menses and decreasing.  Denies fevers, headaches, changes in vision, chest pain, SOB, nausea, vomiting, diarrhea, constipation, RUQ tenderness, dysuria, or LE pain.    O:   Vitals:    24 0913 24 1226 24 1532 24 2354   BP: 106/61 126/81 110/66 121/74   BP Location: Right arm Left arm  Right arm   Pulse: 74 55 81 92   Resp: 16 16 16 16   Temp: 97.9  F (36.6  C) 98.2  F (36.8  C) 98.3  F (36.8  C) 97.7  F (36.5  C)   TempSrc: Oral Oral Oral Axillary   SpO2:       Weight:       Height:         NAD, AAOx3, RRR, CTAB, ABd soft NTND, Firm fundus 1cm below the umbilicus, LE NT no edema    A: 27yo  s/PPD#2  who is recovering well.    P: Routine post partum care.  Plan for discharge today.  Add Pepcid for GERD.

## 2024-07-02 ENCOUNTER — TELEPHONE (OUTPATIENT)
Dept: OBGYN | Facility: CLINIC | Age: 29
End: 2024-07-02
Payer: COMMERCIAL

## 2024-07-02 NOTE — TELEPHONE ENCOUNTER
Feels pain when she poops, she feels that she has hemorrhoids. Very hard to poop, feels pressure. Her milk has come in. No fever, no cough, no trouble urinating.     Discussed using preparation H cream to help with pain and miralax to help soften stools. Encouraged fluids as stool softeners will only help if she is well hydrated. Encouraged her to call the office tomorrow if no improvement. Questions answered.    Janey Baron MD, MSCI, FACOG    [facility]  July 2, 2024

## 2024-07-05 ENCOUNTER — TELEPHONE (OUTPATIENT)
Dept: OBGYN | Facility: CLINIC | Age: 29
End: 2024-07-05
Payer: COMMERCIAL

## 2024-07-05 NOTE — TELEPHONE ENCOUNTER
Writer called and spoke with Vic the patients  regarding a refill for her Witch Hazel pad.       Writer let Vic know that I did send over a refill for his wife to their pharmacy on file.     All questions answered.

## 2024-07-05 NOTE — TELEPHONE ENCOUNTER
[facility] Call Center    Phone Message    May a detailed message be left on voicemail: yes     Reason for Call: Other: Pt spouse Vic calling stating pt recently gave birth and pt was given witch-ines from hospital and pt has ran out. Vic is wanting to know should pt purchase more. Please advise      Action Taken: Message routed to:  Other: [facility]    Travel Screening: Not Applicable

## 2024-07-16 ASSESSMENT — EDINBURGH POSTNATAL DEPRESSION SCALE (EPDS)
I HAVE FELT SAD OR MISERABLE: NO, NOT AT ALL
TOTAL SCORE: 3
THINGS HAVE BEEN GETTING ON TOP OF ME: NO, MOST OF THE TIME I HAVE COPED QUITE WELL
I HAVE BEEN SO UNHAPPY THAT I HAVE HAD DIFFICULTY SLEEPING: NOT VERY OFTEN
I HAVE FELT SCARED OR PANICKY FOR NO GOOD REASON: NO, NOT MUCH
THE THOUGHT OF HARMING MYSELF HAS OCCURRED TO ME: NEVER
I HAVE LOOKED FORWARD WITH ENJOYMENT TO THINGS: AS MUCH AS I EVER DID
I HAVE BEEN ANXIOUS OR WORRIED FOR NO GOOD REASON: NO, NOT AT ALL
I HAVE BEEN SO UNHAPPY THAT I HAVE BEEN CRYING: NO, NEVER
I HAVE BLAMED MYSELF UNNECESSARILY WHEN THINGS WENT WRONG: NO, NEVER
I HAVE BEEN ABLE TO LAUGH AND SEE THE FUNNY SIDE OF THINGS: AS MUCH AS I ALWAYS COULD

## 2024-07-16 NOTE — PROGRESS NOTES
[facility] [place]  Postpartum Visit    Subjective:  28 year old  presents for 2 week post partum visit s/p  on . She was delivered at 37w 6d due to PROM with an uncomplicated  course. She had a 2nd degree perineal laceration repaired with sutures.    Primary concern today is for vaginal itching since delivery. She attributes this itching to new pad use. She has tried vaseline and witch hazel pads which have not provided adequate relief. She denies pain, burning, urinary symptoms. She denies abnormal smell, texture or quantity of vaginal discharge. She denies anything in the vagina since delivery.     - Pt is doing well.   - Breastfeeding with good latch to both breasts. No nipple pain.   - Lochia:  Scant brown.   - Mood stable.  Coping well with support from .   - Not interested in birth control currently.  - Is not due for pap. Per patient had normal pap last year.    Objective:  /72   Pulse 105   Wt 76.2 kg (168 lb)   LMP 10/08/2023 (Exact Date)   BMI 30.53 kg/m      General- no apparent distress  CV: Normal S1/S2. No murmurs auscultated  Pulm: CTAB  Abdomen- Fundus non tender  Extremities- no edema  Vulva: Hyperpigmentation near perineum and labia majora. No external lesions, normal hair distribution, no adenopathy  Vagina: Moist, pink, no abnormal discharge, well rugated, no lesions. Well healing laceration repair with visible vicryl sutures.      Assessment/Plan:  Irma Weinberg is a 28 year old  who is 2 weeks postpartum s/p     # [employer]  - Removed vicryl sutures from perineal laceration repair in office  - Discussed removal of the offending agent as a first line therapy- in this case postpartum pads and consider substitution with period underwear, new brand of pads, or discontinuing pad use  - Recommended continued use of emollients and wearing loose clothing    # Postpartum Care  - No signs or symptoms of PPD   -  Breastfeeding well  - Not currently interested in contraception  - Is not due for pap  - RTO for 6 week check and prn if questions or concerns.  - All pts questions discussed and answered.  Pt verbalized understanding of and agreement to plan of care.     Staffed with Dr. Natalie Guerrero MD  Obstetrics, Gynecology & Women's Health   Resident, PGY-1  07/16/2024 3:38 PM    Patient was seen by the resident in Continuity of Care Clinic.  I reviewed the history & was present for the patient's exam.  The patient's assessment and plan were made jointly.    Destiny Estrada MD MPH

## 2024-07-17 ENCOUNTER — PRENATAL OFFICE VISIT (OUTPATIENT)
Dept: OBGYN | Facility: CLINIC | Age: 29
End: 2024-07-17
Payer: COMMERCIAL

## 2024-07-17 VITALS
SYSTOLIC BLOOD PRESSURE: 107 MMHG | DIASTOLIC BLOOD PRESSURE: 72 MMHG | WEIGHT: 168 LBS | BODY MASS INDEX: 30.53 KG/M2 | HEART RATE: 105 BPM

## 2024-07-17 DIAGNOSIS — L28.0 LICHEN SIMPLEX: Primary | ICD-10-CM

## 2024-07-17 PROCEDURE — 99213 OFFICE O/P EST LOW 20 MIN: CPT

## 2024-07-17 PROCEDURE — 99212 OFFICE O/P EST SF 10 MIN: CPT | Mod: 24 | Performed by: OBSTETRICS & GYNECOLOGY

## 2024-07-17 PROCEDURE — 99207 PR POST PARTUM EXAM: CPT | Mod: GC | Performed by: OBSTETRICS & GYNECOLOGY

## 2024-07-17 NOTE — PATIENT INSTRUCTIONS
Thank you for trusting us with your care!     If you need to contact us for questions about:  Symptoms, Scheduling & Medical Questions; Non-urgent (2-3 day response) [employer] message, Urgent (needing response today) [phone] (if after 3:30pm next day response)   Prescriptions: Please call your Pharmacy   Billing: [facility] [phone] or [facility]:[phone]

## 2024-07-17 NOTE — NURSING NOTE
Chief Complaint   Patient presents with    Post Partum Exam     2 week post partum check DD 6/28/2024    Jessica Juarez LPN

## 2024-08-07 ASSESSMENT — EDINBURGH POSTNATAL DEPRESSION SCALE (EPDS)
THINGS HAVE BEEN GETTING ON TOP OF ME: NO, I HAVE BEEN COPING AS WELL AS EVER
I HAVE BEEN ANXIOUS OR WORRIED FOR NO GOOD REASON: NO, NOT AT ALL
I HAVE BEEN ABLE TO LAUGH AND SEE THE FUNNY SIDE OF THINGS: AS MUCH AS I ALWAYS COULD
I HAVE BEEN SO UNHAPPY THAT I HAVE BEEN CRYING: NO, NEVER
I HAVE FELT SAD OR MISERABLE: NO, NOT AT ALL
TOTAL SCORE: 0
THE THOUGHT OF HARMING MYSELF HAS OCCURRED TO ME: NEVER
I HAVE BLAMED MYSELF UNNECESSARILY WHEN THINGS WENT WRONG: NO, NEVER
I HAVE FELT SCARED OR PANICKY FOR NO GOOD REASON: NO, NOT AT ALL
I HAVE LOOKED FORWARD WITH ENJOYMENT TO THINGS: AS MUCH AS I EVER DID
I HAVE BEEN SO UNHAPPY THAT I HAVE HAD DIFFICULTY SLEEPING: NOT AT ALL

## 2024-08-08 ENCOUNTER — PRENATAL OFFICE VISIT (OUTPATIENT)
Dept: OBGYN | Facility: CLINIC | Age: 29
End: 2024-08-08
Attending: OBSTETRICS & GYNECOLOGY
Payer: COMMERCIAL

## 2024-08-08 DIAGNOSIS — K64.9 HEMORRHOIDS, UNSPECIFIED HEMORRHOID TYPE: ICD-10-CM

## 2024-08-08 DIAGNOSIS — K59.00 CONSTIPATION, UNSPECIFIED CONSTIPATION TYPE: ICD-10-CM

## 2024-08-08 PROCEDURE — 99213 OFFICE O/P EST LOW 20 MIN: CPT

## 2024-08-08 PROCEDURE — 99213 OFFICE O/P EST LOW 20 MIN: CPT | Mod: 25 | Performed by: OBSTETRICS & GYNECOLOGY

## 2024-08-08 RX ORDER — HYDROCORTISONE ACETATE 25 MG/1
25 SUPPOSITORY RECTAL 2 TIMES DAILY PRN
Qty: 30 SUPPOSITORY | Refills: 1 | Status: SHIPPED | OUTPATIENT
Start: 2024-08-08

## 2024-08-08 RX ORDER — AMOXICILLIN 250 MG
1-2 CAPSULE ORAL 2 TIMES DAILY PRN
Qty: 60 TABLET | Refills: 1 | Status: SHIPPED | OUTPATIENT
Start: 2024-08-08

## 2024-08-08 RX ORDER — BISACODYL 10 MG
10 SUPPOSITORY, RECTAL RECTAL DAILY PRN
Qty: 30 SUPPOSITORY | Refills: 1 | Status: SHIPPED | OUTPATIENT
Start: 2024-08-08

## 2024-08-08 ASSESSMENT — ANXIETY QUESTIONNAIRES
1. FEELING NERVOUS, ANXIOUS, OR ON EDGE: NOT AT ALL
GAD7 TOTAL SCORE: 2
IF YOU CHECKED OFF ANY PROBLEMS ON THIS QUESTIONNAIRE, HOW DIFFICULT HAVE THESE PROBLEMS MADE IT FOR YOU TO DO YOUR WORK, TAKE CARE OF THINGS AT HOME, OR GET ALONG WITH OTHER PEOPLE: NOT DIFFICULT AT ALL
6. BECOMING EASILY ANNOYED OR IRRITABLE: NOT AT ALL
7. FEELING AFRAID AS IF SOMETHING AWFUL MIGHT HAPPEN: NOT AT ALL
5. BEING SO RESTLESS THAT IT IS HARD TO SIT STILL: NOT AT ALL
2. NOT BEING ABLE TO STOP OR CONTROL WORRYING: SEVERAL DAYS
3. WORRYING TOO MUCH ABOUT DIFFERENT THINGS: SEVERAL DAYS
GAD7 TOTAL SCORE: 2

## 2024-08-08 ASSESSMENT — PAIN SCALES - GENERAL: PAINLEVEL: EXTREME PAIN (8)

## 2024-08-08 ASSESSMENT — PATIENT HEALTH QUESTIONNAIRE - PHQ9
5. POOR APPETITE OR OVEREATING: NOT AT ALL
SUM OF ALL RESPONSES TO PHQ QUESTIONS 1-9: 2

## 2024-08-08 NOTE — NURSING NOTE
Chief Complaint   Patient presents with    Post Partum Exam     DD 6/29/2024      SUBJECTIVE:   Irma Weinberg is here for her 6-week postpartum checkup.     PHQ-9 score:   Hx of Abuse:  No    Delivery Date: 6/29/2024.    Delivering provider:  .    Type of delivery:  .     Delivery complications:   Infant gender:  boy, weight 6 pounds 6 oz.  Feeding Method:  .  Complications reported with feeding:  none, infant thriving .    Bleeding:  None.  Duration:  .  Menses resumed:  No  Bowel/Urinary problems:  Yes  C/O constipation , hemorrhoids    Contraception Planned:  abstinence  She  has not had intercourse since delivery..         Jessica Juarez LPN

## 2024-08-08 NOTE — PROGRESS NOTES
6 Week Postpartum Visit Note    S:  Irma Weinberg is a 28 year old  here for her 6-week postpartum checkup. She reports she is overall doing well. Her primary issue is regarding constipation and hemorrhoids. Reports severe pain with bowel movements, no blood in stool noted. She had external hemorrhoids immediately following delivery that resolved with preparation H, but now for the past several weeks has had internal pain and hemorrhoids. Does endorse issues with constipation, stool is very hard. Using miralax, 1-2 times per day which has not really helped. No other concerns at this time. Vulvar itching has resolved.    Delivery Date: 24  Delivering provider:  delivered at [facility] as Siria was on divert.    Type of delivery:  .  Perineum:  second degree laceration, repaired.     Infant gender:  boy, weight 6 pounds 6 oz.  Feeding Method:  .  Complications reported with feeding:  none, infant thriving .    Bleeding:  None.  Menses resumed:  No  Bowel/Urinary problems:  No    PHQ-9 score: 2    Contraception Planned:  abstinence  She has not had intercourse since delivery..      ROS: 10 point ROS neg other than the symptoms noted above in the HPI.     O:  EXAM:  LMP 10/08/2023 (Exact Date)   Breastfeeding Yes   General: alert, oriented, sitting comfortably  Psych: normal mood and affect  Abdomen: soft, non-tender, no masses appreciated.  : normal external genitalia, laceration healing well. Pink, moist vaginal mucosa  Rectal: no visible hemorrhoids or fissures, normal REJI with no blood on glove, no palpable internal defect    A:   28 year old  6 weeks s/p . Here for postpartum visit. Doing well in the postpartum period    P:    Constipation  Hemorrhoids  - Given symptoms and normal exam, suspect pain is secondary to hemorrhoids  - Start anusol suppositories BID prn  - Discussed constipation management to help with healing hemorrhoids. Continue miralax, add pericolace and  dulcolax suppositories as needed    Postpartum Care  - Contraception: declines  - Feeding: breast  - Continue PNV    Return to clinic as needed.    Staffed with Dr. Lopez.    Monica Adan MD  OB/GYN Resident, PGY-4    The Patient was seen in Resident Continuity Clinic by Dr. Adan.   I reviewed the history & exam. Assessment and plan were jointly made.   Monica Lopez MD, MPH

## 2024-08-08 NOTE — PATIENT INSTRUCTIONS
Thank you for trusting us with your care!   Please be aware, if you are on Mychart, you may see your results prior to your providers review. If labs are abnormal, we will call or message you on [place] with a follow up plan.    If you need to contact us for questions about:  Symptoms, Scheduling & Medical Questions; Non-urgent (2-3 day response) [employer] message, Urgent (needing response today) [phone] (if after 3:30pm next day response)   Prescriptions: Please call your Pharmacy   Billing: Kari [phone] or [facility]:[phone]

## 2024-08-08 NOTE — PROGRESS NOTES
There are no exam notes on file for this visit.     ================================================================  ROS: 10 point ROS neg other than the symptoms noted above in the HPI.   {ROS COMPLETE:318668}    EXAM:  LMP 10/08/2023 (Exact Date)     General: {:50}  Psych: {:391772}  Last PHQ-9 score on record= No Value exists for the : HP#PHQ9  Breasts:  {[facility] USP POSTPARTUM BREAST:321957029}  Abdomen: {[facility] usp abdomen:301130865}  Incision:  {INCISION APPEARANCE:613574766}   Vulva:  {[facility] USP VULVA EXAM :085047133}  Vagina:  {VAGINALIST:786875888}  Cervix:  {CERVIX :939517}.    Uterus:  {UTERUS :367693334}    Adnexa:  {ADNEXA :027096}  Recto-vaginal:   {RECTAL NEGATIVES LIST:599760}    {Nexplanon inserted?  If no, DELETE:608144240}    {IUD placed?  If no, DELETE:862553260}    ASSESSMENT:   No diagnosis found.   Normal postpartum exam after {OB DELIVERY METHOD:456983882}  Pregnancy was complicated by:  {[facility] USP L&D COMPLICATIONS:581827387}.      PLAN:  No orders of the defined types were placed in this encounter.     No orders of the defined types were placed in this encounter.       {[facility] USP PLAN OPTIONS:379730519}

## 2025-01-14 ENCOUNTER — TRANSFERRED RECORDS (OUTPATIENT)
Dept: HEALTH INFORMATION MANAGEMENT | Facility: CLINIC | Age: 30
End: 2025-01-14

## 2025-01-14 ENCOUNTER — MEDICAL CORRESPONDENCE (OUTPATIENT)
Dept: HEALTH INFORMATION MANAGEMENT | Facility: CLINIC | Age: 30
End: 2025-01-14
Payer: COMMERCIAL

## 2025-01-15 ENCOUNTER — OFFICE VISIT (OUTPATIENT)
Dept: FAMILY MEDICINE | Facility: CLINIC | Age: 30
End: 2025-01-15
Payer: COMMERCIAL

## 2025-01-15 ENCOUNTER — TRANSCRIBE ORDERS (OUTPATIENT)
Dept: OTHER | Age: 30
End: 2025-01-15

## 2025-01-15 VITALS
SYSTOLIC BLOOD PRESSURE: 107 MMHG | HEIGHT: 62 IN | OXYGEN SATURATION: 97 % | RESPIRATION RATE: 18 BRPM | BODY MASS INDEX: 31.93 KG/M2 | WEIGHT: 173.5 LBS | HEART RATE: 70 BPM | TEMPERATURE: 98.5 F | DIASTOLIC BLOOD PRESSURE: 70 MMHG

## 2025-01-15 DIAGNOSIS — K92.1 HEMATOCHEZIA: Primary | ICD-10-CM

## 2025-01-15 DIAGNOSIS — K62.5 RECTAL BLEEDING: ICD-10-CM

## 2025-01-15 DIAGNOSIS — K64.8 INTERNAL HEMORRHOID: ICD-10-CM

## 2025-01-15 DIAGNOSIS — K64.9 HEMORRHOIDS, UNSPECIFIED HEMORRHOID TYPE: Primary | ICD-10-CM

## 2025-01-15 PROCEDURE — 99203 OFFICE O/P NEW LOW 30 MIN: CPT | Performed by: FAMILY MEDICINE

## 2025-01-15 NOTE — PROGRESS NOTES
"  Assessment & Plan     Hematochezia    - Colonoscopy Screening  Referral; Future    Internal hemorrhoid    Patient reports she has history of internal hemorrhoids since she had her baby delivered 6 months ago.  She  was treated with Anusol suppository for 7 days, now she is using topical Anusol.     She reports she was seen last week with a female provider who did an internal exam she was told she had internal hemorrhoid, she has been asked to continue using Anusol HC topical for 7 days.  Otherwise, she has no pain, no pain with bowel movement, she reports she has a regular bowel movement no constipation.  Continue to see blood in the stool and sometimes when she wipes.  No abdominal pain, no nausea, no vomiting, no fever no chills.    No history or family history of inflammatory bowel disease, no history of colon cancer.    Discussed with patient could be related to internal hemorrhoid, no symptoms or history of fistula or fissure.  Advised patient to increase her fiber intake, fluid intake, avoid constipation and straining.  Advised her to hold her prenatal vitamin for a week or so  Continue with Anusol topical for 1 more week.    Discussed with patient and her  if symptom does not improve, or continue to have blood in the stool, or worsening she could call and set up for a colonoscopy.      BMI  Estimated body mass index is 31.33 kg/m  as calculated from the following:    Height as of this encounter: 1.585 m (5' 2.4\").    Weight as of this encounter: 78.7 kg (173 lb 8 oz).   Weight management plan: Discussed healthy diet and exercise guidelines      Work on weight loss  Regular exercise    Noemi Solis is a 29 year old, presenting for the following health issues:  Rectal Problem    No change in bowel movement, denies abdominal pain, no nausea no vomiting, no weight loss.        1/15/2025     9:34 AM   Additional Questions   Roomed by morelia hernandez ma   Accompanied by  and child         " "1/15/2025     9:34 AM   Patient Reported Additional Medications   Patient reports taking the following new medications none     History of Present Illness       Reason for visit:  Concern About blood mixed with mucus with each bowel movment ,is it reakted to the hemorrhoids i used to have or not  Symptom onset:  1-2 weeks ago  Symptoms include:  With each bowel movment i have spots of blood mixed with mucus in the toilet but not mixed with stools ,i had internak hemorrhoids after i gave birth but i feel better now so i want to know why i still have blood  Symptom intensity:  Mild  Symptom progression:  Staying the same  Had these symptoms before:  Yes  Has tried/received treatment for these symptoms:  Yes  What makes it worse:  I don't know  What makes it better:  I want to have an answer She is missing 2 dose(s) of medications per week.  She is not taking prescribed medications regularly due to remembering to take.         Review of Systems  Constitutional, HEENT, cardiovascular, pulmonary, GI, , musculoskeletal, neuro, skin, endocrine and psych systems are negative, except as otherwise noted.      Objective    /70 (BP Location: Right arm, Patient Position: Sitting, Cuff Size: Adult Regular)   Pulse 70   Temp 98.5  F (36.9  C) (Oral)   Resp 18   Ht 1.585 m (5' 2.4\")   Wt 78.7 kg (173 lb 8 oz)   SpO2 97%   BMI 31.33 kg/m    Body mass index is 31.33 kg/m .  Physical Exam   GENERAL: alert and no distress  ABDOMEN: soft, nontender, no hepatosplenomegaly, no masses and bowel sounds normal  SKIN: no suspicious lesions or rashes  BACK: no CVA tenderness, no paralumbar tenderness    Orders Placed This Encounter   Procedures    REVIEW OF HEALTH MAINTENANCE PROTOCOL ORDERS    Colonoscopy Screening  Referral            Signed Electronically by: Brittnee Cheung MD    "

## 2025-01-19 ENCOUNTER — HEALTH MAINTENANCE LETTER (OUTPATIENT)
Age: 30
End: 2025-01-19

## 2025-01-22 ENCOUNTER — TELEPHONE (OUTPATIENT)
Dept: GASTROENTEROLOGY | Facility: CLINIC | Age: 30
End: 2025-01-22
Payer: COMMERCIAL

## 2025-01-22 ENCOUNTER — HOSPITAL ENCOUNTER (OUTPATIENT)
Facility: AMBULATORY SURGERY CENTER | Age: 30
End: 2025-01-22
Attending: STUDENT IN AN ORGANIZED HEALTH CARE EDUCATION/TRAINING PROGRAM | Admitting: STUDENT IN AN ORGANIZED HEALTH CARE EDUCATION/TRAINING PROGRAM
Payer: COMMERCIAL

## 2025-01-22 ENCOUNTER — TRANSFERRED RECORDS (OUTPATIENT)
Dept: HEALTH INFORMATION MANAGEMENT | Facility: CLINIC | Age: 30
End: 2025-01-22
Payer: COMMERCIAL

## 2025-01-22 NOTE — TELEPHONE ENCOUNTER
Attempted to contact patient in order to complete pre assessment questions.     The patient did answer but was unable to do pre-assessment at the time. She will call back. Left message to return call to [phone] option 2    Callback communication sent via [employer].      Mulu Claire LPN  Endoscopy Procedure Pre Assessment

## 2025-01-22 NOTE — TELEPHONE ENCOUNTER
Per scheduling questions patients  states she is exclusively breastfeeding and has questions regarding the procedures impact. Patient should discuss this with the ordering provider.     No consent to communicate in chart.     Pre visit planning completed.      Procedure details:    Patient scheduled for Colonoscopy on 2/3/25.     Arrival time: 1300. Procedure time 1400    [facility]; [address] [place] [address], 5th Floor, [place] [address]. Check in location: 5th Floor.    Sedation type: Conscious sedation     Pre op exam needed? No.    Indication for procedure: hematochezia      Chart review:     Electronic implanted devices? No    Recent diagnosis of diverticulitis within the last 6 weeks? No      Medication review:    Diabetic? No    Anticoagulants? No    Weight loss medication/injectable? No GLP-1 medication per patient's medication list. Nursing to verify with pre-assessment call.    Other medication HOLDING recommendations:  N/A      Prep for procedure:     Bowel prep recommendation: Standard Miralax.   Due to: standard bowel prep    Procedure information and instructions sent via [employer]         Lidia Diaz RN  Endoscopy Procedure Pre Assessment   [phone] option 2

## 2025-01-22 NOTE — TELEPHONE ENCOUNTER
"Please note shavonne the caller, pt's spouse, stated shavonne the pt is actively breastfeeding and they  have questions and concerns regarding the procedures impact can have,    Endoscopy Scheduling Screen    Have you had any respiratory illness or flu-like symptoms in the last 10 days?  No    What is your communication preference for Instructions and/or Bowel Prep?   MyChart    What insurance is in the chart?  Other:  BCBS    Ordering/Referring Provider: Brittnee Cheung MD   (If ordering provider performs procedure, schedule with ordering provider unless otherwise instructed. )    BMI: Estimated body mass index is 31.33 kg/m  as calculated from the following:    Height as of 1/15/25: 1.585 m (5' 2.4\").    Weight as of 1/15/25: 78.7 kg (173 lb 8 oz).     Sedation Ordered  moderate sedation.   If patient BMI > 50 do not schedule in ASC.    If patient BMI > 45 do not schedule at [facility].    Are you taking methadone or Suboxone?  NO, No RN review required.    Have you been diagnosed and are being treated for severe PTSD or severe anxiety?  NO, No RN review required.    Are you taking any prescription medications for pain 3 or more times per week?   NO, No RN review required.    Do you have a history of malignant hyperthermia?  No    (Females) Are you currently pregnant?   No     Have you been diagnosed or told you have pulmonary hypertension?   No    Do you have an LVAD?  No    Have you been told you have moderate to severe sleep apnea?  No.    Have you been told you have COPD, asthma, or any other lung disease?  No    Do you have any heart conditions?  No     Have you ever had or are you waiting for an organ transplant?  No. Continue scheduling, no site restrictions.    Have you had a stroke or transient ischemic attack (TIA aka \"mini stroke\" in the last 6 months?   No    Have you been diagnosed with or been told you have cirrhosis of the liver?   No.    Are you currently on dialysis?   No    Do you need assistance " "transferring?   No    BMI: Estimated body mass index is 31.33 kg/m  as calculated from the following:    Height as of 1/15/25: 1.585 m (5' 2.4\").    Weight as of 1/15/25: 78.7 kg (173 lb 8 oz).     Is patients BMI > 40 and scheduling location [facility]?  No    Do you take an injectable or oral medication for weight loss or diabetes (excluding insulin)?  No    Do you take the medication Naltrexone?  No    Do you take blood thinners?  No       Prep   Are you currently on dialysis or do you have chronic kidney disease?  No    Do you have a diagnosis of diabetes?  No    Do you have a diagnosis of cystic fibrosis (CF)?  No    On a regular basis do you go 3 -5 days between bowel movements?  No    BMI > 40?  No    Preferred Pharmacy:    [facility] - [place], [place] - [address]  [address]  [facility] [address]  Phone: [phone] Fax: [phone]      Final Scheduling Details     Procedure scheduled  Colonoscopy    Surgeon:  Lui     Date of procedure:  02/03/2025     Pre-OP / PAC:   No - Not required for this site.    Location  [facility] - ASC - Patient preference.    Sedation   Moderate Sedation - Per order.      Patient Reminders:   You will receive a call from a Nurse to review instructions and health history.  This assessment must be completed prior to your procedure.  Failure to complete the Nurse assessment may result in the procedure being cancelled.      On the day of your procedure, please designate an adult(s) who can drive you home stay with you for the next 24 hours. The medicines used in the exam will make you sleepy. You will not be able to drive.      You cannot take public transportation, ride share services, or non-medical taxi service without a responsible caregiver.  Medical transport services are allowed with the requirement that a responsible caregiver will receive you at your destination.  We require that drivers and caregivers are confirmed prior to your procedure.  "

## 2025-01-27 ENCOUNTER — HOSPITAL ENCOUNTER (OUTPATIENT)
Facility: AMBULATORY SURGERY CENTER | Age: 30
End: 2025-01-27
Attending: INTERNAL MEDICINE | Admitting: STUDENT IN AN ORGANIZED HEALTH CARE EDUCATION/TRAINING PROGRAM
Payer: COMMERCIAL

## 2025-01-27 ENCOUNTER — TELEPHONE (OUTPATIENT)
Dept: GASTROENTEROLOGY | Facility: CLINIC | Age: 30
End: 2025-01-27
Payer: COMMERCIAL

## 2025-01-27 NOTE — TELEPHONE ENCOUNTER
Rescheduled Colonoscopy  Due to patient requested another day/time.      **Noted in pre assessment scheduling TE 1/22/25, it is noted the patient was breastfeeding.  Patient will need to discuss any concerns with her ordering provider.    --------------------------------------------------------------------------------------------------------------------      Pre visit planning completed.      Procedure details:    Patient scheduled for Colonoscopy on 2/10/25.     Approximate arrival time: 1300. Procedure time 1345.   *Ensure patient is aware that endoscopy team will be calling about 2 days prior to procedure date to confirm arrival time as this may change.     Facility location: [facility]; [address] [place], [place] [address] , 2nd Floor, [place] [address]. Check in location: 2nd Floor at Surgery desk.  *Disclaimer: Drivers are to check in with patient and stay on campus during procedure.     Sedation type: Conscious sedation     Pre op exam needed? No.    Indication for procedure: screening      Chart review:     Electronic implanted devices? No    Recent diagnosis of diverticulitis within the last 6 weeks? No      Medication review:    Diabetic? No    Anticoagulants? No    Weight loss medication/injectable? No GLP-1 medication per patient's medication list. Nursing to verify with pre-assessment call.    Other medication HOLDING recommendations:  N/A      Prep for procedure:     Bowel prep recommendation: Standard Miralax.   Due to: standard bowel prep    Prep instructions sent via [employer]         Corinne Kliber, RN  Endoscopy Procedure Pre Assessment   [phone] option 2

## 2025-01-27 NOTE — TELEPHONE ENCOUNTER
Caller: Pt's Spouse Tyrone     Reason for Reschedule/Cancellation (please be detailed, any staff messages or encounters to note?):   Need following week    Did you cancel or rescheduled an EUS procedure? No.    Is screening questionnaire older than 3 months from the reschedule date.   If Yes, please complete screening questionnaire. No    Prior to reschedule please review:  Ordering Provider: Brittnee Cheung MD in [facility]  Sedation Determined: Mod  Does patient have any ASC Exclusions, please identify?: No    Notes on Cancelled Procedure:  Procedure: Lower Endoscopy [Colonoscopy]   Date: 2.3.25  [facility]; [address], 5th Floor, [place] [address]  Surgeon: Lui    Rescheduled: Yes,   Procedure: Lower Endoscopy [Colonoscopy]    Date: 2.10.25   Location: [facility] [place]; [address] Floor, [place] [address]    Surgeon: Yusef   Sedation Level Scheduled  Mod ,  Reason for Sedation Level Orde   Instructions updated and sent: MC     Does patient need PAC or Pre -Op Rescheduled? : No

## 2025-01-28 NOTE — TELEPHONE ENCOUNTER
Attempted to contact patient in order to complete pre assessment questions.     Called patient but it was not a good time for her because her baby was sleeping. She will call back. Left message to return call to [phone] option 2    Callback communication sent via [employer].      Mulu Claire LPN  Endoscopy Procedure Pre Assessment

## 2025-01-28 NOTE — TELEPHONE ENCOUNTER
Diagnosis, Referred by & from: Hemorrhoids   Appt date: 3/28/2025   NOTES STATUS DETAILS   OFFICE NOTE from referring provider Received [facility]:  1/22/25, 1/6/25 - PCC OV with Jaylin Isaac NP   OFFICE NOTE from other specialist Internal / Received [facility] [place]:  1/15/25 - PCC OV with Dr. Jonatan Win:  1/14/25 - PCC OV with Dr. Olivo    [facility]  8[facility] - OBGYN OV with Dr. Adan   DISCHARGE SUMMARY from hospital N/A    DISCHARGE REPORT from the ER N/A    OPERATIVE REPORT N/A    MEDICATION LIST Received    LABS     ANAL PAP/CEA N/A    BIOPSIES/PATHOLOGY RELATED TO DIAGNOSIS Internal    DIAGNOSTIC PROCEDURES     PFC TESTING (from the Pelvic floor center includes Manometry, PDNL, EMG, etc.) N/A    COLONOSCOPY (most recent all time after 5 years) Internal [facility]:  ([facility]) 2/10/25 - Colonoscopy   FLEX SIGMOIDOSCOPY N/A    UPPER ENDOSCOPY (EGD) N/A    ERCP N/A    IMAGING (DISC & REPORT)      CT N/A    MRI N/A    XRAY N/A    ULTRASOUND  (ENDOANAL/ENDORECTAL) N/A      Records Requested  01/28/25    Facility  [facility]  Fax: [phone]   Outcome * 1/28/25 11:35 AM Faxed request to [facility] for records to be faxed to the clinic. - [place]    * 2/18/25 7:50 AM Faxed 2nd request to [facility] for records to be faxed to the clinic. - [place]    * 2/18/25 1:39 PM Record received from [facility] and sent to [facility] to be scanned into the chart. - [place]

## 2025-02-06 ENCOUNTER — TELEPHONE (OUTPATIENT)
Dept: GASTROENTEROLOGY | Facility: CLINIC | Age: 30
End: 2025-02-06
Payer: COMMERCIAL

## 2025-02-06 NOTE — TELEPHONE ENCOUNTER
Caller: pt's sppouse    Reason for Reschedule/Cancellation (please be detailed, any staff messages or encounters to note?):   Pt schedule conflict    Did you cancel or rescheduled an EUS procedure? No.    Is screening questionnaire older than 3 months from the reschedule date.   If Yes, please complete screening questionnaire. No    Prior to reschedule please review:  Ordering Provider: Brittnee Cheung MD  Sedation Determined: CS  Does patient have any ASC Exclusions, please identify?: N    Notes on Cancelled Procedure:  Procedure: Lower Endoscopy [Colonoscopy]   Date: 02/10/2025  Location: [facility]; [address], [place] [address]   Surgeon: Yusef    Rescheduled: Yes,   Procedure: Lower Endoscopy [Colonoscopy]    Date: 03/05/2025   Location: [facility]; [address] Floor, [place] [address]    Surgeon: Perfecto   Sedation Level Scheduled  CS ,  Reason for Sedation Level Order   Instructions updated and sent: Y     Does patient need PAC or Pre -Op Rescheduled? : n

## 2025-02-17 ENCOUNTER — TELEPHONE (OUTPATIENT)
Dept: GASTROENTEROLOGY | Facility: CLINIC | Age: 30
End: 2025-02-17
Payer: COMMERCIAL

## 2025-02-17 NOTE — TELEPHONE ENCOUNTER
Pre assessment completed for upcoming procedure.   (Please see previous telephone encounter notes for complete details)    I called and spoke with patient       Procedure details:    Approximate time and facility location reviewed.   Patient is aware that endoscopy team will be calling about 2 days prior to confirm arrival time.    Designated  policy reviewed and that site requests drivers to check in and stay on campus. Instructed to have someone stay 6  hours post procedure.   *Disclaimer - please notify the [facility] GI staff with any  issues/concerns.    Medication review:    Medications reviewed. Please see supporting documentation below. Holding recommendations discussed (if applicable).       Prep for procedure:     Procedure prep instructions reviewed.        Any additional information needed:  N/A      Patient verbalized understanding and had no questions or concerns at this time.      Brandee Baez LPN  Endoscopy Procedure Pre Assessment   [phone] option 3

## 2025-02-17 NOTE — TELEPHONE ENCOUNTER
Rescheduled Colonoscopy  Due to scheduling conflict  Please see TE 1/22/25 for further details and information from previously scheduled procedure.   **Noted in pre assessment scheduling TE 1/22/25, it is noted the patient was breastfeeding. Patient will need to discuss any concerns with her ordering provider.     Pre visit planning completed.      Procedure details:    Patient scheduled for Colonoscopy on 3/5/25.     Approximate arrival time: 0835. Procedure time 0920.   *Ensure patient is aware that endoscopy team will be calling about 2 days prior to procedure date to confirm arrival time as this may change.     Facility location: [facility]; [address], [place] [address]. Check in location: 2nd Floor at Surgery desk.  *Disclaimer: Drivers are to check in with patient and stay on campus during procedure.     Sedation type: Conscious sedation     Pre op exam needed? No.    Indication for procedure: Hematochezia       Chart review:     Electronic implanted devices? No    Recent diagnosis of diverticulitis within the last 6 weeks? No      Medication review:    Diabetic? No    Anticoagulants? No    Weight loss medication/injectable? No GLP-1 medication per patient's medication list. Nursing to verify with pre-assessment call.    Other medication HOLDING recommendations:  N/A      Prep for procedure:     Bowel prep recommendation: Standard Miralax.   Due to: standard bowel prep    Prep instructions sent via [employer]         Zoya Mitchell RN  Endoscopy Procedure Pre Assessment   [phone] option 3

## 2025-03-03 ENCOUNTER — TELEPHONE (OUTPATIENT)
Dept: GASTROENTEROLOGY | Facility: CLINIC | Age: 30
End: 2025-03-03
Payer: COMMERCIAL

## 2025-03-03 NOTE — TELEPHONE ENCOUNTER
Left voicemail of arrival time of 8:30 AM.     [employer] message sent with updated arrival time.

## 2025-03-28 ENCOUNTER — PRE VISIT (OUTPATIENT)
Dept: SURGERY | Facility: CLINIC | Age: 30
End: 2025-03-28